# Patient Record
Sex: MALE | Race: WHITE | NOT HISPANIC OR LATINO | Employment: OTHER | URBAN - METROPOLITAN AREA
[De-identification: names, ages, dates, MRNs, and addresses within clinical notes are randomized per-mention and may not be internally consistent; named-entity substitution may affect disease eponyms.]

---

## 2017-06-13 ENCOUNTER — GENERIC CONVERSION - ENCOUNTER (OUTPATIENT)
Dept: OTHER | Facility: OTHER | Age: 72
End: 2017-06-13

## 2017-10-11 ENCOUNTER — ALLSCRIPTS OFFICE VISIT (OUTPATIENT)
Dept: OTHER | Facility: OTHER | Age: 72
End: 2017-10-11

## 2017-10-11 ENCOUNTER — TRANSCRIBE ORDERS (OUTPATIENT)
Dept: ADMINISTRATIVE | Facility: HOSPITAL | Age: 72
End: 2017-10-11

## 2017-10-11 DIAGNOSIS — G60.9 HEREDITARY PERIPHERAL NEUROPATHY: Primary | ICD-10-CM

## 2017-10-11 DIAGNOSIS — G60.9 HEREDITARY AND IDIOPATHIC NEUROPATHY: ICD-10-CM

## 2017-10-11 DIAGNOSIS — G60.9 IDIOPATHIC PERIPHERAL NEUROPATHY: Primary | ICD-10-CM

## 2017-10-11 DIAGNOSIS — G60.8 OTHER HEREDITARY AND IDIOPATHIC NEUROPATHIES: ICD-10-CM

## 2017-10-13 NOTE — CONSULTS
Plan  Idiopathic polyneuropathy    · (1) FOLATE; Status:Active; Requested for:11Oct2017;    Perform:[facility]; Due:11Oct2018; Ordered; For:Idiopathic polyneuropathy; Ordered By:Lauren Carroll;   · (1) PROTEIN ELECTRO, SERUM; Status:Active; Requested for:11Oct2017;    Perform:[facility]; Due:11Oct2018; Ordered; For:Idiopathic polyneuropathy; Ordered By:Lauren Carroll;   · (Q) METHYLMALONIC ACID AND HOMOCYSTEINE (NUTRITIONAL AND CONGENITAL); Status:Active; Requested for:11Oct2017;    Perform:[facility]; Due:11Oct2018; Ordered; For:Idiopathic polyneuropathy; Ordered By:Lauren Carroll;   · EMG TWO EXTREMITIES WITH OR W/O RELATED PARASPINAL AREAS; Status:Active; Requested for:11Oct2017;    Perform:[facility]; Due:11Oct2018; Last Updated By: Abiodun Suárez; 10/11/2017 12:07:30 PM;Ordered; For:Idiopathic polyneuropathy; Ordered By:Lauren Carroll;  TWO : RUE  ONE : HELDER   · Follow-up visit in 2 months Evaluation and Treatment  Follow-up  Status: Complete   Done: [id number]   Ordered; For: Idiopathic polyneuropathy; Ordered By: Alina Elder Performed:  Due: [id number]; Last Updated By: Abiodun Suárez; 10/11/2017 12:07:30 PM  Idiopathic polyneuropathy, Mixed sensory-motor polyneuropathy    · (1) VITAMIN B12; Status:Active; Requested for:11Oct2017;    Perform:[facility]; Due:11Oct2018; Ordered; For:Idiopathic polyneuropathy, Mixed sensory-motor polyneuropathy; Ordered By:Lauren Carroll;   · EMG TWO EXTREMITIES WITH OR W/O RELATED PARASPINAL AREAS; Status:Active; Requested for:11Oct2017;    Perform:[facility]; Due:11Oct2018; Last Updated By: Abiodun Suárez; 10/11/2017 12:07:30 PM;Ordered; For:Idiopathic polyneuropathy, Mixed sensory-motor polyneuropathy; Ordered By:Lauren Carroll;  TWO : RLE  ONE : LLE    Discussion/Summary  Discussion Summary:   65 y/o male with a history of diabetes presents with numbness and tingling in legs and arms , exam reveals evidence of a polyneuropathy in his legs b12, folate , mma and homcysteine, SPEP , b6 level, to determine any other causes EMG of UE , CTS and polyneuropathy   he is s/p prior release of carpal tunnel and cubital tunnel and EMG of the lower extremity for neuropathy in his ankles   it will help to determine extent of disease   Lyrica 75 mg daily , to continue , unable to tolerate tid dosing further recommendations after emerging database maintain good glucose control , can help to stem the progression of neuropathy but does not repair the damage   I discussed this in detail with Mr Khadra Mata and Earnesteen Night f/u after EMG testing  Chief Complaint  Chief Complaint Free Text Note Form: Patient presents for neurology consult for neuropathy      History of Present Illness  HPI: This is a 66 rh male with a history of diabetes and psoriatic arthritis presents for numbness and pain in hands and feet  The symptoms began 4 to 5 years ago in the hands at the same time he was diagnosed with diabetes  It involves the feet to the ankle bilaterally and all of the fingers on the right ( except for the thumb) and the second digit on the left hand   three years ago he underwent right carpal tunnel release and cubital tunnel release which reduced the numbness in the palm  No wrist , elbow or ankle pain   he was placed on Lyrica 75 mg tid but he developed side effects ( weight gain, increase in blood sugars and incontinence) and he reduced the dose to 75 mg daily   Lyrica 75 mg daily has decreased the intensity of the pain   recent hbAic was 6 2   gabapentin was tried but it caused side effects    no falls or problems with balance   he is accompanied by Earnesteen Night , his daughter   complaints include left hip pain and difficulty walking   he presents for further recommendations   psoriatic arthritis is better on Humira , less swelling in hands      Review of Systems  Neurological ROS:   Constitutional: no fever, no chills, no recent weight gain, no recent weight loss, no complaints of feeling tired, no changes in appetite  HEENT: hearing loss,-tinnitus-and-left hearing aid  Cardiovascular:  no chest pain or pressure, no palpitations present, the heart rate was not rapid or irregular, no swelling in the arms or legs, no poor circulation  Respiratory:  no unusual or persistant cough, no shortness of breath with or without exertion  Gastrointestinal:  no nausea, no vomiting, no diarrhea, no abdominal pain, no changes in bowel habits, no melena, no loss of bowel control  Genitourinary: feelings of urinary urgency-and-increased frequency  Musculoskeletal: arthralgias-and-pain while walking  Integumentary  no masses, no rash, no skin lesions, no livedo reticularis  Psychiatric:  no anxiety, no depression, no mood swings, no psychiatric hospitalizations, no sleep problems  Endocrine  no unusual weight loss or gain, no excessive urination, no excessive thirst, no hair loss or gain, no hot or cold intolerance, no menstrual period change or irregularity, no loss of sexual ability or drive, no erection difficulty, no nipple discharge  Hematologic/Lymphatic:  no unusual bleeding, no tendency for easy bruising, no clotting skin or lumps  Neurological General: snoring-and-waking up at night  Neurological Mental Status:  no confusion, no mood swings, no alteration or loss of consciousness, no difficulty expressing/understanding speech, no memory problems  Neurological Cranial Nerves:  no blurry or double vision, no loss of vision, no face drooping, no facial numbness or weakness, no taste or smell loss/changes, no hearing loss or ringing, no vertigo or dizziness, no dysphagia, no slurred speech  Neurological Motor findings include:  no tremor, no twitching, no cramping(pre/post exercise), no atrophy  Neurological Coordination: unsteadiness-  no unsteadiness, no vertigo or dizziness, no clumsiness, no problems reaching for objects     Neurological Sensory:  no numbness, no pain, no tingling, does not fall when eyes closed or taking a shower  Neurological Gait: difficulty walking  Active Problems  1  Idiopathic polyneuropathy (356 9) (G60 9)   2  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)    Past Medical History  1  History of arthritis (V13 4) (Z87 39)   2  History of diabetes mellitus (V12 29) (Z86 39)   3  History of hypertension (V12 59) (Z86 79)   4  History of myocardial infarction (412) (I25 2)    Surgical History  1  History of Hernia Repair   2  History of Neuroplasty Decompression Median Nerve At Carpal Tunnel   3  History of Neuroplasty With Transposition Of Ulnar Nerve - At Elbow   4  History of Surgery Prostate Prostatotomy    Family History  Mother    1  Family history of    2  Family history of cardiac disorder (V17 49) (Z82 49)   3  Family history of diabetes mellitus (V18 0) (Z83 3)  Father    4  Family history of    5  Family history of malignant neoplasm (V16 9) (Z80 9)  Sister    6  Family history of    7  Family history of malignant neoplasm (V16 9) (Z80 9)    Social History   · Coffee   · Former smoker (U42 53) (B06 238)   · No illicit drug use   · Retired   · Social drinker (V49 89) (Z78 9)   ·  (V61 07) (Z63 4)    Current Meds   1  AmLODIPine Besylate 10 MG Oral Tablet; TAKE 1 TABLET DAILY; Therapy: (Recorded:2017) to Recorded   2  Aspirin 81 MG CAPS; take 1 capsule daily; Therapy: (Recorded:2017) to Recorded   3  Folic Acid 1 MG Oral Tablet; Take 1 tablet daily; Therapy: (Recorded:2017) to Recorded   4  HydroCHLOROthiazide 25 MG Oral Tablet; TAKE 1 TABLET DAILY; Therapy: (Recorded:2017) to Recorded   5  Lyrica 75 MG Oral Capsule; TAKE 1 CAPSULE 3 TIMES DAILY; Therapy: (Recorded:2017) to Recorded   6  MetFORMIN HCl - 1000 MG Oral Tablet; take 2 tablet daily; Therapy: (Recorded:2017) to Recorded   7  Nabumetone 750 MG Oral Tablet; Take 1 tablet twice daily;    Therapy: (Recorded:2017) to Recorded   8  Probiotic CAPS; take 1 capsule daily; Therapy: (Recorded:11Oct2017) to Recorded   9  Simvastatin 40 MG Oral Tablet; TAKE 1 TABLET DAILY; Therapy: (Recorded:11Oct2017) to Recorded   10  TraMADol HCl - 50 MG Oral Tablet; TAKE 1 TABLET Every 6 hours; Therapy: (Recorded:11Oct2017) to Recorded   11  Vitamin B12 TABS; TAKE 1 TABLET DAILY AS DIRECTED; Therapy: (Recorded:11Oct2017) to Recorded   12  Vitamin B6 TABS; TAKE 1 TABLET DAILY AS DIRECTED; Therapy: (Recorded:11Oct2017) to Recorded   13  Vitamin C 1000 MG Oral Tablet; TAKE 1 TABLET DAILY; Therapy: (Recorded:11Oct2017) to Recorded   14  Vitamin D3 2000 UNIT Oral Tablet; Take 1 tablet daily; Therapy: (Recorded:11Oct2017) to Recorded    Allergies  1  Penicillins    Vitals  Signs   Recorded: [id number] 10:59AM   Heart Rate: 88  Respiration: 16  Systolic: 341  Diastolic: 72  Height: 5 ft 7 in  Weight: 242 lb   BMI Calculated: 37 9  BSA Calculated: 2 19  O2 Saturation: 97    Physical Exam    Constitutional   General Appearance: Appears appropriate for age, healthy, well developed, appropriately groomed and appropriately dressed    Head and Face   Head and face: Atraumatic on inspection  Facial strength normal    Eyes   Ophthalmoscopic examination: Vision is grossly normal  Gross visual field testing by confrontation shows no abnormalities  EOMI in both eyes  Conjunctivae clear  Eyelids normal palpebral fissures equal  Orbits exhibit normal position  No discharge from the eyes  PERRL  Neck   Neck and thyroid: Normal to inspection and palpation  Pulmonary   Respiratory effort: Lungs are clear bilaterally  Auscultation of lungs: Clear to auscultation  Cardiovascular   Auscultation of heart: Rate is regular  Rhythm is regular  Carotid pulses: Normal, 2+ bilaterally  -no bruits  Musculoskeletal   Gait and Station: Walks with normal gait  Tandem walk test is normal  Romberg's test is negative     Muscle strength: Normal strength throughout  Muscle tone: No atrophy, abnormal movements, flaccidity, cogwheeling or spasticity  Involuntary movements: None observed  Stability: Normal       Neurologic   Orientation to person, place, and time: Normal     Recent and remote memory: Demonstrates normal memory  Attention span and concentration: Normal thought process and attention span  Language: Names objects, able to repeat phrases and speaks spontaneously  Fund of knowledge: Normal vocabulary with appropriate knowledge of current events and past history  Sensation: Abnormal  -(jps to ankle , vibration absent in toes , temp to midankle , light touch to mid ankle )   Reflexes: Abnormal   Deep tendon reflexes: 1+ right biceps,-1+ left biceps,-1+ right triceps,-1+ left triceps,-1+ right patella,-1+ left patella,-0 right ankle jerk-and-0 left ankle jerk  Coordination: Cerebellum function intact  No involuntary movement or psychomotor activity  Cortical function: Normal     Cranial Nerve Exam   I: Normal with no deficit    II: Normal with no deficit  III,IV, VI: Normal with no deficit  V: Normal with no deficit  VII: Normal with no deficit  VIII: Normal with no deficit  IX: Normal with no deficit  X: Normal with no deficit  XI: Normal with no deficit  XII: Normal with no deficit  Signatures   Electronically signed by :  Rosenda Ledbetter DO; Oct 12 2017 10:18AM EST                       (Author)

## 2017-10-16 ENCOUNTER — HOSPITAL ENCOUNTER (OUTPATIENT)
Dept: NEUROLOGY | Facility: AMBULATORY SURGERY CENTER | Age: 72
Discharge: HOME | End: 2017-10-16
Payer: MEDICARE

## 2017-10-16 DIAGNOSIS — G60.9 IDIOPATHIC PERIPHERAL NEUROPATHY: ICD-10-CM

## 2017-10-16 PROCEDURE — 95886 MUSC TEST DONE W/N TEST COMP: CPT

## 2017-10-16 PROCEDURE — 95910 NRV CNDJ TEST 7-8 STUDIES: CPT

## 2017-11-14 ENCOUNTER — HOSPITAL ENCOUNTER (OUTPATIENT)
Dept: NEUROLOGY | Facility: AMBULATORY SURGERY CENTER | Age: 72
Discharge: HOME | End: 2017-11-14
Payer: MEDICARE

## 2017-11-14 DIAGNOSIS — G60.9 HEREDITARY PERIPHERAL NEUROPATHY: ICD-10-CM

## 2017-11-14 PROCEDURE — 95911 NRV CNDJ TEST 9-10 STUDIES: CPT

## 2017-11-14 PROCEDURE — 95886 MUSC TEST DONE W/N TEST COMP: CPT

## 2018-01-12 VITALS
OXYGEN SATURATION: 97 % | WEIGHT: 242 LBS | SYSTOLIC BLOOD PRESSURE: 144 MMHG | DIASTOLIC BLOOD PRESSURE: 72 MMHG | HEART RATE: 88 BPM | RESPIRATION RATE: 16 BRPM | HEIGHT: 67 IN | BODY MASS INDEX: 37.98 KG/M2

## 2018-02-05 ENCOUNTER — OFFICE VISIT (OUTPATIENT)
Dept: NEUROLOGY | Facility: CLINIC | Age: 73
End: 2018-02-05
Payer: MEDICARE

## 2018-02-05 VITALS
DIASTOLIC BLOOD PRESSURE: 76 MMHG | WEIGHT: 248 LBS | BODY MASS INDEX: 37.59 KG/M2 | HEART RATE: 76 BPM | SYSTOLIC BLOOD PRESSURE: 132 MMHG | HEIGHT: 68 IN

## 2018-02-05 DIAGNOSIS — E08.42 DIABETIC POLYNEUROPATHY ASSOCIATED WITH DIABETES MELLITUS DUE TO UNDERLYING CONDITION (HCC): Primary | ICD-10-CM

## 2018-02-05 DIAGNOSIS — G60.8 MIXED SENSORY-MOTOR POLYNEUROPATHY: ICD-10-CM

## 2018-02-05 PROCEDURE — 99213 OFFICE O/P EST LOW 20 MIN: CPT | Performed by: PSYCHIATRY & NEUROLOGY

## 2018-02-05 RX ORDER — LIDOCAINE 50 MG/G
3 PATCH TOPICAL DAILY
Qty: 90 PATCH | Refills: 5 | Status: SHIPPED | OUTPATIENT
Start: 2018-02-05 | End: 2018-06-11 | Stop reason: ALTCHOICE

## 2018-02-05 RX ORDER — AMLODIPINE BESYLATE AND BENAZEPRIL HYDROCHLORIDE 5; 10 MG/1; MG/1
1 CAPSULE ORAL DAILY
COMMUNITY
Start: 2017-11-16

## 2018-02-05 RX ORDER — PREGABALIN 75 MG/1
1 CAPSULE ORAL 3 TIMES DAILY
COMMUNITY
End: 2018-02-05 | Stop reason: SDUPTHER

## 2018-02-05 RX ORDER — PREGABALIN 50 MG/1
1 CAPSULE ORAL DAILY
COMMUNITY
Start: 2017-11-29 | End: 2018-02-05 | Stop reason: SDUPTHER

## 2018-02-05 RX ORDER — HYDROCHLOROTHIAZIDE 25 MG/1
25 TABLET ORAL DAILY
COMMUNITY
Start: 2017-11-16

## 2018-02-05 RX ORDER — SIMVASTATIN 40 MG
40 TABLET ORAL DAILY
COMMUNITY

## 2018-02-05 RX ORDER — FOLIC ACID 1 MG/1
1 TABLET ORAL DAILY
COMMUNITY

## 2018-02-05 RX ORDER — NABUMETONE 750 MG/1
750 TABLET, FILM COATED ORAL 2 TIMES DAILY
COMMUNITY
End: 2019-05-13

## 2018-02-05 RX ORDER — PREGABALIN 75 MG/1
75 CAPSULE ORAL 2 TIMES DAILY
Qty: 60 CAPSULE | Refills: 5 | Status: SHIPPED | OUTPATIENT
Start: 2018-02-05 | End: 2018-06-11 | Stop reason: SDUPTHER

## 2018-02-05 RX ORDER — FUROSEMIDE 40 MG/1
40 TABLET ORAL AS NEEDED
COMMUNITY
Start: 2017-07-24 | End: 2020-02-10

## 2018-02-05 NOTE — ASSESSMENT & PLAN NOTE
Increase Lyrica to 75 mg bid , watch for increase in weight gain     Add lidoderm patches , up to three patches daily or lidoderm otc cream , it is helping the pt     On less b12 , level high in past     F/u in 4mths

## 2018-02-05 NOTE — PROGRESS NOTES
Patient ID: Gely Alaniz is a 74 y o  male  Assessment/Plan:    Mixed sensory-motor polyneuropathy  Increase Lyrica to 75 mg bid , watch for increase in weight gain     Add lidoderm patches , up to three patches daily or lidoderm otc cream , it is helping the pt     On less b12 , level high in past     F/u in 4mths                    Subjective:    HPI       This is a 66 rh male with a history of diabetes and psoriatic arthritis presents for numbness and pain in hands and feet  The symptoms began 4 to 5 years ago in the hands at the same time he was diagnosed with diabetes  It involves the feet to the ankle bilaterally and all of the fingers on the right ( except for the thumb) and the second digit on the left hand   three years ago he underwent right carpal tunnel release and cubital tunnel release which reduced the numbness in the palm  No wrist , elbow or ankle pain   he was placed on Lyrica 75 mg tid but he developed side effects ( weight gain, increase in blood sugars and incontinence) and he reduced the dose to 75 mg daily   gabapentin was tried but it caused side effects   no falls or problems with balance   He underwent emg of the le and ue  They confirmed the presence of a polyneruapthy   Lyrica was increased to 75 mg at ngith and 50 mg at bedtime   Overall the sympotms are better   He is using otc lidoderm cream evry 3 to 4 days with benefit   he is accompanied by Rosalind Ugalde , his daughter   complaints include left hip pain and difficulty walking   he presents for further recommendations   psoriatic arthritis is better on Humira , less swelling in hands           October 2017 b12   1450 , b12 500mg , spep  Normal   hemocysteins  0 29 , last hb a1c 6 6     f  The following portions of the patient's history were reviewed and updated as appropriate: PMX, pshx, allergies , shx and family history          Objective:    Blood pressure 132/76, pulse 76, height 5' 8" (1 727 m), weight 112 kg (248 lb)      Physical Exam   Constitutional: He appears well-developed  HENT:   Head: Normocephalic  Eyes: EOM are normal  Pupils are equal, round, and reactive to light  Neck: Normal range of motion  Neurological: He has normal strength  Neurological Exam    Mental Status  The patient is alert  He has no visuospatial neglect  He has normal attention span and concentration  He has a normal fund of knowledge  Cranial Nerves    CN II: The patient's visual acuity and visual fields are normal   CN III, IV, VI: The patient's pupils are equally round and reactive to light and ocular movements are normal   CN V: The patient has normal facial sensation  CN VII:  The patient has symmetric facial movement  CN VIII:  The patient's hearing is normal   CN IX, X: The patient has symmetric palate movement and normal gag reflex  CN XI: The patient's shoulder shrug strength is normal   CN XII: The patient's tongue is midline without atrophy or fasciculations  Motor   His strength is 5/5 throughout all four extremities  Limited by hip pain      Sensory    Vibration  3sec in left  , absent on right  , jps intact , absent in toes , 10 sec in ue      Reflexes  1 in ue , trace in le, toes down going          ROS:    Review of Systems   Constitutional: Negative  HENT: Negative  Eyes: Negative  Respiratory: Negative  Cardiovascular: Positive for leg swelling  Gastrointestinal: Negative  Endocrine: Negative  Genitourinary: Positive for frequency  Musculoskeletal: Negative  Skin: Negative  Allergic/Immunologic: Negative  Neurological: Negative  Hematological: Negative  Psychiatric/Behavioral: Negative

## 2018-02-07 ENCOUNTER — DOCUMENTATION (OUTPATIENT)
Dept: NEUROLOGY | Facility: CLINIC | Age: 73
End: 2018-02-07

## 2018-06-11 ENCOUNTER — OFFICE VISIT (OUTPATIENT)
Dept: NEUROLOGY | Facility: CLINIC | Age: 73
End: 2018-06-11
Payer: MEDICARE

## 2018-06-11 VITALS
HEIGHT: 68 IN | DIASTOLIC BLOOD PRESSURE: 84 MMHG | SYSTOLIC BLOOD PRESSURE: 132 MMHG | BODY MASS INDEX: 36.54 KG/M2 | HEART RATE: 94 BPM | WEIGHT: 241.1 LBS | RESPIRATION RATE: 12 BRPM

## 2018-06-11 DIAGNOSIS — G60.8 MIXED SENSORY-MOTOR POLYNEUROPATHY: Primary | ICD-10-CM

## 2018-06-11 DIAGNOSIS — E08.42 DIABETIC POLYNEUROPATHY ASSOCIATED WITH DIABETES MELLITUS DUE TO UNDERLYING CONDITION (HCC): ICD-10-CM

## 2018-06-11 PROCEDURE — 99213 OFFICE O/P EST LOW 20 MIN: CPT | Performed by: PSYCHIATRY & NEUROLOGY

## 2018-06-11 RX ORDER — LANOLIN ALCOHOL/MO/W.PET/CERES
500 CREAM (GRAM) TOPICAL
COMMUNITY

## 2018-06-11 RX ORDER — TRAMADOL HYDROCHLORIDE 50 MG/1
60 TABLET ORAL EVERY 6 HOURS PRN
COMMUNITY

## 2018-06-11 RX ORDER — PREGABALIN 75 MG/1
75 CAPSULE ORAL 3 TIMES DAILY
Qty: 90 CAPSULE | Refills: 5 | Status: SHIPPED | OUTPATIENT
Start: 2018-06-11 | End: 2018-11-12 | Stop reason: SINTOL

## 2018-06-11 RX ORDER — MULTIVIT WITH MINERALS/LUTEIN
1000 TABLET ORAL DAILY
COMMUNITY

## 2018-06-11 RX ORDER — MULTIVITAMIN WITH IRON
100 TABLET ORAL DAILY
COMMUNITY

## 2018-06-11 NOTE — ASSESSMENT & PLAN NOTE
Will increase Lyrica to 75 mg tid,, he is willing to try despite prior side effects    Asked pt to use Aspercreme to more frequently     May be beneficial for pruritus      f/u in 6 mths

## 2018-07-16 NOTE — TELEPHONE ENCOUNTER
Pt called back and put his daughter on the phone, she states this was an error, they just picked up the 75 mg TID script

## 2018-07-16 NOTE — TELEPHONE ENCOUNTER
As per last visit he was on lyrica 75 mg tid   Nick Christine Why does he need 50 mg tablets ? Is he getting worse ?

## 2018-11-12 ENCOUNTER — OFFICE VISIT (OUTPATIENT)
Dept: NEUROLOGY | Facility: CLINIC | Age: 73
End: 2018-11-12
Payer: MEDICARE

## 2018-11-12 VITALS
DIASTOLIC BLOOD PRESSURE: 60 MMHG | WEIGHT: 247 LBS | HEIGHT: 68 IN | SYSTOLIC BLOOD PRESSURE: 132 MMHG | RESPIRATION RATE: 16 BRPM | HEART RATE: 88 BPM | BODY MASS INDEX: 37.44 KG/M2

## 2018-11-12 DIAGNOSIS — G60.8 MIXED SENSORY-MOTOR POLYNEUROPATHY: Primary | ICD-10-CM

## 2018-11-12 PROCEDURE — 99213 OFFICE O/P EST LOW 20 MIN: CPT | Performed by: PSYCHIATRY & NEUROLOGY

## 2018-11-12 RX ORDER — ISOSORBIDE MONONITRATE 30 MG/1
30 TABLET, EXTENDED RELEASE ORAL
COMMUNITY
Start: 2018-09-12 | End: 2022-04-08

## 2018-11-12 RX ORDER — HYDRALAZINE HYDROCHLORIDE 50 MG/1
50 TABLET, FILM COATED ORAL
COMMUNITY
Start: 2018-09-26 | End: 2019-05-13

## 2018-11-12 RX ORDER — POTASSIUM CHLORIDE 20 MEQ/1
20 TABLET, EXTENDED RELEASE ORAL
COMMUNITY
Start: 2018-10-03 | End: 2022-04-08

## 2018-11-12 RX ORDER — PREGABALIN 50 MG/1
50 CAPSULE ORAL 2 TIMES DAILY
Qty: 180 CAPSULE | Refills: 1 | Status: SHIPPED | OUTPATIENT
Start: 2018-11-12 | End: 2019-05-13 | Stop reason: SDUPTHER

## 2018-11-12 RX ORDER — METOPROLOL SUCCINATE 50 MG/1
50 TABLET, EXTENDED RELEASE ORAL
COMMUNITY
Start: 2018-10-10 | End: 2022-04-08

## 2018-11-12 NOTE — ASSESSMENT & PLAN NOTE
His neuropathy symptoms are somewhat stable  His dose was decreased to 75 /  50 several months ago  due to the fact that he has no burning dysesthesias but continues to have some lower extremity swelling I have  decreased it further to 50 mg twice a day  This is the most likely cause of neuropathy is his known history of diabetes  Overall his exam is stable  Follow he is to follow up in approximately six months

## 2019-05-13 ENCOUNTER — OFFICE VISIT (OUTPATIENT)
Dept: NEUROLOGY | Facility: CLINIC | Age: 74
End: 2019-05-13
Payer: MEDICARE

## 2019-05-13 VITALS
WEIGHT: 250.6 LBS | SYSTOLIC BLOOD PRESSURE: 128 MMHG | BODY MASS INDEX: 37.98 KG/M2 | HEIGHT: 68 IN | DIASTOLIC BLOOD PRESSURE: 74 MMHG | RESPIRATION RATE: 12 BRPM

## 2019-05-13 DIAGNOSIS — G60.8 MIXED SENSORY-MOTOR POLYNEUROPATHY: ICD-10-CM

## 2019-05-13 DIAGNOSIS — G60.9 HEREDITARY AND IDIOPATHIC NEUROPATHY: ICD-10-CM

## 2019-05-13 DIAGNOSIS — G56.21 CUBITAL TUNNEL SYNDROME ON RIGHT: Primary | ICD-10-CM

## 2019-05-13 DIAGNOSIS — H49.22 SIXTH NERVE PALSY OF LEFT EYE: ICD-10-CM

## 2019-05-13 PROCEDURE — 99214 OFFICE O/P EST MOD 30 MIN: CPT | Performed by: PSYCHIATRY & NEUROLOGY

## 2019-05-13 RX ORDER — NITROGLYCERIN 0.4 MG/1
0.4 TABLET SUBLINGUAL
COMMUNITY
Start: 2018-12-27 | End: 2022-04-08

## 2019-05-13 RX ORDER — TORSEMIDE 20 MG/1
40 TABLET ORAL 3 TIMES DAILY
COMMUNITY
Start: 2019-04-12

## 2019-05-13 RX ORDER — DOXAZOSIN 2 MG/1
2 TABLET ORAL
COMMUNITY
Start: 2018-12-27 | End: 2022-04-08

## 2019-05-13 RX ORDER — PREGABALIN 50 MG/1
50 CAPSULE ORAL 3 TIMES DAILY
Qty: 270 CAPSULE | Refills: 1 | Status: SHIPPED | OUTPATIENT
Start: 2019-05-13 | End: 2019-11-18 | Stop reason: SDUPTHER

## 2019-05-13 RX ORDER — SIMVASTATIN 40 MG
TABLET ORAL
COMMUNITY
Start: 2018-10-01 | End: 2019-05-13

## 2019-06-13 ENCOUNTER — TELEPHONE (OUTPATIENT)
Dept: OTHER | Facility: HOSPITAL | Age: 74
End: 2019-06-13

## 2019-06-13 ENCOUNTER — HOSPITAL ENCOUNTER (OUTPATIENT)
Dept: NEUROLOGY | Facility: AMBULATORY SURGERY CENTER | Age: 74
Discharge: HOME | End: 2019-06-13
Payer: MEDICARE

## 2019-06-13 DIAGNOSIS — G56.21 CUBITAL TUNNEL SYNDROME ON RIGHT: Primary | ICD-10-CM

## 2019-06-13 DIAGNOSIS — G56.21 CUBITAL TUNNEL SYNDROME ON RIGHT: ICD-10-CM

## 2019-06-13 DIAGNOSIS — G60.8 MIXED SENSORY-MOTOR POLYNEUROPATHY: ICD-10-CM

## 2019-06-13 PROCEDURE — 95886 MUSC TEST DONE W/N TEST COMP: CPT | Performed by: PSYCHIATRY & NEUROLOGY

## 2019-06-13 PROCEDURE — 95912 NRV CNDJ TEST 11-12 STUDIES: CPT | Performed by: PSYCHIATRY & NEUROLOGY

## 2019-11-14 ENCOUNTER — TELEPHONE (OUTPATIENT)
Dept: NEUROLOGY | Facility: CLINIC | Age: 74
End: 2019-11-14

## 2019-11-18 ENCOUNTER — OFFICE VISIT (OUTPATIENT)
Dept: NEUROLOGY | Facility: CLINIC | Age: 74
End: 2019-11-18
Payer: MEDICARE

## 2019-11-18 ENCOUNTER — TELEPHONE (OUTPATIENT)
Dept: NEUROLOGY | Facility: CLINIC | Age: 74
End: 2019-11-18

## 2019-11-18 VITALS
DIASTOLIC BLOOD PRESSURE: 68 MMHG | BODY MASS INDEX: 38.68 KG/M2 | WEIGHT: 254.4 LBS | HEART RATE: 69 BPM | SYSTOLIC BLOOD PRESSURE: 120 MMHG

## 2019-11-18 DIAGNOSIS — R51.9 PERSISTENT HEADACHES: ICD-10-CM

## 2019-11-18 DIAGNOSIS — G60.8 MIXED SENSORY-MOTOR POLYNEUROPATHY: ICD-10-CM

## 2019-11-18 DIAGNOSIS — G56.21 CUBITAL TUNNEL SYNDROME ON RIGHT: Primary | ICD-10-CM

## 2019-11-18 DIAGNOSIS — H49.22 SIXTH NERVE PALSY OF LEFT EYE: ICD-10-CM

## 2019-11-18 DIAGNOSIS — G60.9 HEREDITARY AND IDIOPATHIC NEUROPATHY, UNSPECIFIED: ICD-10-CM

## 2019-11-18 PROCEDURE — 99214 OFFICE O/P EST MOD 30 MIN: CPT | Performed by: PSYCHIATRY & NEUROLOGY

## 2019-11-18 RX ORDER — DULOXETIN HYDROCHLORIDE 30 MG/1
30 CAPSULE, DELAYED RELEASE ORAL DAILY
Qty: 30 CAPSULE | Refills: 2 | Status: SHIPPED | OUTPATIENT
Start: 2019-11-18 | End: 2020-06-15 | Stop reason: SINTOL

## 2019-11-18 RX ORDER — PREGABALIN 50 MG/1
50 CAPSULE ORAL 2 TIMES DAILY
Qty: 180 CAPSULE | Refills: 1 | Status: SHIPPED | OUTPATIENT
Start: 2019-11-18 | End: 2020-07-23 | Stop reason: SDUPTHER

## 2019-11-18 NOTE — PROGRESS NOTES
Patient ID: Lynda Desouza is a 74 y o  male  Assessment/Plan:    Mixed sensory-motor polyneuropathy  Mr Alirio Paul  is a 64-year-old patient with history of diabetes and polyneuropathy  He is currently on Lyrica 50 mg twice a day higher doses have resulted in lower extremity edema  His examination is relatively unchanged but he does report persistent dysesthesias  I have added duloxetine 30 mg a day  His recent CMP was analyzed and that liver function tests were normal     I did order a B12 and folate    Cubital tunnel syndrome on right  He does have cubital tunnel syndrome that is non localizable  He continues to have numbness and tingling in the upper extremities  Placed another consult Orthopedics   I have reordered it since he continues to have more severe symptoms in the 4th and 5th digit on the right hand  Sixth nerve palsy of left eye  This is now resolved  Persistent headaches  He describes persistent unilateral headaches occurring on the right side This is been occurring for months    He uses tylenol up ot  to six a day some improvement however the headaches usually return  This seems to be consistent with  rebound headaches and chronic daily headaches  He did have an MRI of the brain which showed no evidence of new ischemia  We did consider the use of nortriptyline and or Elavil however he has a known history of cardiac dysfunction  Beta-blockers are relatively contraindicated  I did provide him with a left list of dietary restrictions for headaches as well as a list of headache triggers we have started him on Cymbalta 30 mg a day which may help both neuropathic pain and headaches  If the headaches persist we can also try Topamax or Depakote    He is to call for progress report in six weeks otherwise return to our offices in three months            Diagnoses and all orders for this visit:    Cubital tunnel syndrome on right  -     Ambulatory referral to Hand Surgery;  Future    Mixed sensory-motor polyneuropathy  -     DULoxetine (CYMBALTA) 30 mg delayed release capsule; Take 1 capsule (30 mg total) by mouth daily  -     pregabalin (LYRICA) 50 mg capsule; Take 1 capsule (50 mg total) by mouth 2 (two) times a day 3  Month supply  -     Vitamin B12; Future  -     Folate; Future    Hereditary and idiopathic neuropathy, unspecified   -     Vitamin B12; Future  -     Folate; Future    Sixth nerve palsy of left eye    Persistent headaches    Other orders  -     insulin aspart (NovoLOG) 100 Units/mL injection pen; Inject 16 units before lunch and 28 units before dinner         Subjective: This is a 74  rh male with a history of diabetes and psoriatic arthritis presents in a follow up visit for numbness and pain in hands and feet  He is currently on Lyrica 50 mg twice a day  Overall the swelling has improved  However he does complain of dysesthesias in the pads of his feet and intermittently in the right hand  The right hand numbness and tingling involving involves the last two 2 to 3 digits  He has difficulty with strength in the right hand and has coordination problems  He did undergo an EMG study  It was abnormal and did  demonstrate continued evidence of a polyneuropathy however there is absent on her right ulnar sensory nerve as well as abnormal dorsal ulnar cutaneous nerve  It was compared to the prior study in 2017 and there is some progression  It does reveal evidence of a mild ulnar non localizable neuropathy on the right below the elbow and a mild to moderate median nerve entrapment neuropathy at the right wrist    We did discussed orthopedic evaluation at that time but it was not pursued  In the interim he reports continued issues with right arm socially the 4th and 5th digit        He was  Admitted  [facility] three weeks ago for an acute a left 6th nerve palsy  He did have an imaging studies performed  An MRA did demonstrate a basilar proximal narrowing    MRI brain without contrast and MRI of the orbits were negative for mass effect, recent infarct, intracranial hemorrhage, or hydrocephalus  White matter changes were noted likely secondary to moderate chronic microvascular ischemic disease  Focus of T1 signal abnormality was noted in the spinous process of C2 and complex right parotid lesion measuring up to 2 2 cm were noted  With felt to be more of a cyst related  He was diagnosed with cranial VI palsy and admitted for observation  MRA head 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  Diminished flow-related enhancement of the dominant right V4 segment of the vertebral artery relative to the nondominant hand was also noted  Neurology was consulted and suspected left 6th nerve palsy likely secondary to diabetic small vessel disease  In the interim he was also seen by his ophthalmologist t who felt that this may take several weeks to resolve  After six or seven months the double vision improved  He now continues on aspirin daily  Paul Nguyen three years ago he underwent right carpal tunnel release and cubital tunnel release which reduced the numbness in the palm  gabapentin was tried but it caused side effects   Other complaints include persistent headaches occur  They do occur on the right side and are unilateral   He denies any nausea vomiting but does report some slight photophobia  This is been ongoing since January  He does utilize Tylenol up to six a day with some benefit  He denies any lateralizing numbness or tingling with the headaches        Does have a history of sleep apnea but is unable to tolerate the CPAP machine                The following portions of the patient's history were reviewed and updated as appropriate:   He  has a past medical history of CAD (coronary artery disease), Chronic kidney disease, History of prostate cancer, HTN (hypertension), Hyperlipidemia, Leukocytosis, Psoriatic arthritis ([facility] [address] ), SOB (shortness of breath), Syncope, Type 2 diabetes mellitus without complication, without long-term current use of insulin ([address] ), and Weakness generalized  He  has a past surgical history that includes Carpal tunnel release (Bilateral, 2015); Elbow surgery (Right, 2015); Hernia repair; Shoulder arthroscopy; and Skin cancer excision (11/2017)  His family history includes Cancer in his mother; Heart disease in his father  He  reports that he has never smoked  He has never used smokeless tobacco  He reports that he does not drink alcohol or use drugs    Current Outpatient Medications   Medication Sig Dispense Refill    adalimumab (HUMIRA) 40 mg/0 8 mL PSKT Inject 40 mg under the skin every 14 (fourteen) days      amLODIPine-benazepril (LOTREL 5-10) 5-10 MG per capsule Take 1 capsule by mouth daily      Ascorbic Acid (VITAMIN C) 1000 MG tablet Take 1,000 mg by mouth daily      ASPIRIN 81 PO Take 81 mg by mouth daily      cyanocobalamin (VITAMIN B-12) 1,000 mcg tablet Take 500 mcg by mouth      doxazosin (CARDURA) 2 mg tablet Take 2 mg by mouth      folic acid (FOLVITE) 1 mg tablet Take 1 mg by mouth daily      furosemide (LASIX) 40 mg tablet Take 40 mg by mouth as needed       hydrochlorothiazide (HYDRODIURIL) 25 mg tablet Take 25 mg by mouth daily      insulin aspart (NovoLOG) 100 Units/mL injection pen Inject 16 units before lunch and 28 units before dinner      insulin glargine (BASAGLAR KWIKPEN) 100 units/mL injection pen 120 Units daily      isosorbide mononitrate (IMDUR) 30 mg 24 hr tablet Take 30 mg by mouth      metoprolol succinate (TOPROL-XL) 50 mg 24 hr tablet Take 50 mg by mouth      nitroglycerin (NITROSTAT) 0 4 mg SL tablet Place 0 4 mg under the tongue      potassium chloride (K-DUR,KLOR-CON) 20 mEq tablet Take 20 mEq by mouth      pregabalin (LYRICA) 50 mg capsule Take 1 capsule (50 mg total) by mouth 2 (two) times a day 3  Month supply 180 capsule 1    pyridoxine (VITAMIN B6) 100 mg tablet Take 100 mg by mouth daily      simvastatin (ZOCOR) 40 mg tablet Take 40 mg by mouth daily      torsemide (DEMADEX) 20 mg tablet Take 40 mg by mouth 2 (two) times a day      traMADol (ULTRAM) 50 mg tablet Take 60 mg by mouth every 6 (six) hours as needed      DULoxetine (CYMBALTA) 30 mg delayed release capsule Take 1 capsule (30 mg total) by mouth daily 30 capsule 2    Probiotic Product (PROBIOTIC-10 ULTIMATE PO) Take by mouth       No current facility-administered medications for this visit  He is allergic to clindamycin; doxycycline; penicillins; fish oil; methotrexate; methotrexate derivatives; ranitidine; and sulfamethoxazole-trimethoprim            Objective:    Blood pressure 120/68, pulse 69, weight 115 kg (254 lb 6 4 oz)  Physical Exam   Constitutional: He appears well-developed  HENT:   Head: Normocephalic  Eyes: Pupils are equal, round, and reactive to light  Lids are normal    Neurological:   Reflex Scores:       Tricep reflexes are Tr on the right side and Tr on the left side  Bicep reflexes are 1+ on the right side and 1+ on the left side  Patellar reflexes are 1+ on the right side and 1+ on the left side  Achilles reflexes are Tr on the right side and Tr on the left side  Psychiatric: His speech is normal    Vitals reviewed  Neurological Exam  Mental Status   Oriented to person, place, time and situation  Speech is normal  Language is fluent with no aphasia  Cranial Nerves  CN II: Visual acuity is normal  Visual fields full to confrontation  CN III, IV, VI: Extraocular movements intact bilaterally  Normal lids and orbits bilaterally  Pupils equal round and reactive to light bilaterally  CN V: Facial sensation is normal   CN VII: Full and symmetric facial movement  CN VIII: Hearing is normal   CN IX, X: Palate elevates symmetrically  Normal gag reflex  CN XI: Shoulder shrug strength is normal   CN XII: Tongue midline without atrophy or fasciculations    No papilledema was noted  Motor  Normal muscle bulk throughout  No fasciculations present  She is five months out of five strength proximally in the legs but otherwise strength was full  Sensory  10 sec in hands, vibration to knees , temp to  Calf ,        , absent vibration  in toes, ankles , jps intact in  feet,       Reflexes                                           Right                      Left  Biceps                                 1+                         1+  Triceps                                Tr                         Tr  Patellar                                1+                         1+  Achilles                                Tr                         Tr  Plantar                           Downgoing                Downgoing    Coordination  Right: Finger-to-nose normal   Left: Finger-to-nose normal     Gait Unable to rise from chair without using arms  He has a wide-based  He was unable to tandem gait       Review of systems obtained by the medical assistant as below was reviewed with the patient at today's visit    ROS:    Review of Systems   Constitutional: Negative  Negative for appetite change and fever  HENT: Negative  Negative for hearing loss, tinnitus, trouble swallowing and voice change  Eyes: Negative  Negative for photophobia and pain  Respiratory: Negative  Negative for shortness of breath  Cardiovascular: Negative  Negative for palpitations  Gastrointestinal: Negative  Negative for nausea and vomiting  Endocrine: Negative  Negative for cold intolerance and heat intolerance  Genitourinary: Negative  Negative for dysuria, frequency and urgency  Musculoskeletal: Negative  Negative for myalgias and neck pain  Skin: Negative  Negative for rash  Allergic/Immunologic: Negative  Neurological: Positive for numbness (left hand more stinging and numbness, )   Negative for dizziness, tremors, seizures, syncope, facial asymmetry, speech difficulty, weakness, light-headedness and headaches  Hematological: Negative  Does not bruise/bleed easily  Psychiatric/Behavioral: Negative  Negative for confusion, hallucinations and sleep disturbance  All other systems reviewed and are negative

## 2019-11-18 NOTE — TELEPHONE ENCOUNTER
Crow Abdi called from  [facility] at 9:19am and said that the patient was there under the assumption that they were coming to that location for an appointment at 9:30am today but she was sending them over because they have an office visit  I called and let the nurse's pod know that they still should be here on time since they were a couple minutes away  Crow Trinidad said that they may be upset since the patient showed at the wrong location  At 9:45am, Francia Roberson called us in since the patient went to the [facility]  I called the patient on [phone] after talking to Francia Roberson and Nathanael Monroe to let them know that we would be able to get them in with Dr Kat Velez at 12:00pm over the doctor's lunch   I called back Crow Abdi at 9:54am to clarify the situation and she told me that a  from [facility] was trying to call the patient to let them know that their appointment was at the [address] [place] [address] location for 9:30am

## 2019-11-18 NOTE — ASSESSMENT & PLAN NOTE
Mr Rita Adrian  is a 68-year-old patient with history of diabetes and polyneuropathy  He is currently on Lyrica 50 mg twice a day higher doses have resulted in lower extremity edema  His examination is relatively unchanged but he does report persistent dysesthesias  I have added duloxetine 30 mg a day    His recent CMP was analyzed and that liver function tests were normal     I did order a B12 and folate

## 2019-11-18 NOTE — ASSESSMENT & PLAN NOTE
He describes persistent unilateral headaches occurring on the right side This is been occurring for months    He uses tylenol up ot  to six a day some improvement however the headaches usually return  This seems to be consistent with  rebound headaches and chronic daily headaches  He did have an MRI of the brain which showed no evidence of new ischemia  We did consider the use of nortriptyline and or Elavil however he has a known history of cardiac dysfunction  Beta-blockers are relatively contraindicated  I did provide him with a left list of dietary restrictions for headaches as well as a list of headache triggers we have started him on Cymbalta 30 mg a day which may help both neuropathic pain and headaches  If the headaches persist we can also try Topamax or Depakote    He is to call for progress report in six weeks otherwise return to our offices in three months  Carlito Rg

## 2019-11-18 NOTE — ASSESSMENT & PLAN NOTE
He does have cubital tunnel syndrome that is non localizable  He continues to have numbness and tingling in the upper extremities  Placed another consult Orthopedics   I have reordered it since he continues to have more severe symptoms in the 4th and 5th digit on the right hand

## 2020-01-03 ENCOUNTER — TELEPHONE (OUTPATIENT)
Dept: NEUROLOGY | Facility: CLINIC | Age: 75
End: 2020-01-03

## 2020-01-03 NOTE — TELEPHONE ENCOUNTER
Pt daughter called to report pt was on cymbalta 30mg once daily  However he began to develop burning in his b/l hands within 1 week of starting and has now developed urinary incontinence  Due to this they discontinued Cymbalta yesterday  The burning has not yet improved but the urinary incontinence has a little  I did recommend that if urinary incontinence continues that they follow up with pt's PCP to rule out other cause such as UTI  She was agreeable  Any other recommendations?

## 2020-01-03 NOTE — TELEPHONE ENCOUNTER
He already had dysthesias prior to addition of cymbalta so it may take longer    Keep off of cymbalta but nothing else to do

## 2020-02-06 ENCOUNTER — TELEPHONE (OUTPATIENT)
Dept: NEUROLOGY | Facility: CLINIC | Age: 75
End: 2020-02-06

## 2020-02-10 ENCOUNTER — OFFICE VISIT (OUTPATIENT)
Dept: NEUROLOGY | Facility: CLINIC | Age: 75
End: 2020-02-10
Payer: MEDICARE

## 2020-02-10 ENCOUNTER — TELEPHONE (OUTPATIENT)
Dept: NEUROLOGY | Facility: CLINIC | Age: 75
End: 2020-02-10

## 2020-02-10 VITALS
HEART RATE: 69 BPM | HEIGHT: 68 IN | WEIGHT: 258.1 LBS | RESPIRATION RATE: 16 BRPM | BODY MASS INDEX: 39.12 KG/M2 | DIASTOLIC BLOOD PRESSURE: 68 MMHG | SYSTOLIC BLOOD PRESSURE: 132 MMHG

## 2020-02-10 DIAGNOSIS — G56.21 CUBITAL TUNNEL SYNDROME ON RIGHT: ICD-10-CM

## 2020-02-10 DIAGNOSIS — G60.8 MIXED SENSORY-MOTOR POLYNEUROPATHY: Primary | ICD-10-CM

## 2020-02-10 PROBLEM — G47.33 OBSTRUCTIVE SLEEP APNEA (ADULT) (PEDIATRIC): Status: ACTIVE | Noted: 2020-02-10

## 2020-02-10 PROCEDURE — 99214 OFFICE O/P EST MOD 30 MIN: CPT | Performed by: PSYCHIATRY & NEUROLOGY

## 2020-02-10 RX ORDER — TOPIRAMATE 25 MG/1
CAPSULE, COATED PELLETS ORAL
Qty: 60 CAPSULE | Refills: 1 | Status: SHIPPED | OUTPATIENT
Start: 2020-02-10 | End: 2020-06-15 | Stop reason: SINTOL

## 2020-02-10 NOTE — ASSESSMENT & PLAN NOTE
He has a persistent he has a neuropathy  This is resulting in numbness in his feet and dysesthesias in the upper extremities  He is on Lyrica  He is unable to tolerate higher doses due to side effects gabapentin was ineffective and duloxetine urinary is incontinence  He does inform me that his blood sugars have been abnormal and he is attempting to control  In the interim we have started him low-dose Topamax  This has been used off-label for neuropathic symptoms

## 2020-02-10 NOTE — PROGRESS NOTES
Patient ID: Soraya Acuna is a 66 y o  male  Assessment/Plan:    Mixed sensory-motor polyneuropathy  He has a persistent he has a neuropathy  This is resulting in numbness in his feet and dysesthesias in the upper extremities  He is on Lyrica  He is unable to tolerate higher doses due to side effects gabapentin was ineffective and duloxetine urinary is incontinence  He does inform me that his blood sugars have been abnormal and he is attempting to control  In the interim we have started him low-dose Topamax  This has been used off-label for neuropathic symptoms  Sixth nerve palsy of left eye  This is resolved  This was due to a diabetic etiology    Persistent headaches  I am he continues to have persistent daily headaches  A they do occur in the morning  He has taken Tylenol daily  Cymbalta did result in side effects  I have started him on Topamax low dose to 25 milligrams a day for one week then to increase to one tablet of the 25 twice a day  We discussed the common side effects  Can result in dysesthesias as well in cranberry juice can helpful  We discussed the common side effects of weight loss  If this does cause weight lost this then the symptoms of sleep apnea may improve  Obstructive sleep apnea (adult) (pediatric)  He does have known obstructive sleep apnea  He refuses to use the CPAP machine as it does result in problems this breathing  We discussed needing to see a sleep specialist at this point he is reluctant  I have informed her that this can result in early morning headaches and can  increased risk of eart attacks strokes  Cubital tunnel syndrome on right  He does have a history of c cubital tunnel syndrome  I did place an orthopedic evaluation in the past   He has not been called  I will place another consultation but I have have also asked my office to contact our offices directly         Diagnoses and all orders for this visit:    Mixed sensory-motor polyneuropathy  - topiramate (TOPAMAX) 25 mg sprinkle capsule; 1po daily for two weeks then 2 daily     He is to contact our offices in several weeks for progress report  If this is ineffective for causing side effects we could even try lower doses of Cymbalta at their 30 milligrams two or 3 times a week  Should Rich return to our offices in several months but call us if he has any other new questions or problems in his interim    Subjective: This is a 66  rh male with a history of diabetes and psoriatic arthritis presents in a follow up visit for known polyneuropathy   numbness and pain in hands and feet  He is currently on Lyrica 50 mg twice a day  Overall the swelling has improved  However he does complain of dysesthesias in the pads of his feet and intermittently in the right hand  The right hand numbness and tingling involving involves the last two 2 to 3 digits  He has difficulty with strength in the right hand and has coordination problems  He did undergo an EMG study  It was abnormal and did  demonstrate continued evidence of a polyneuropathy however there is absent on her right ulnar sensory nerve as well as abnormal dorsal ulnar cutaneous nerve  It was compared to the prior study in 2017 and there is some progression  It does reveal evidence of a mild ulnar non localizable neuropathy on the right below the elbow and a mild to moderate median nerve entrapment neuropathy at the right wrist    We did discussed orthopedic evaluation at that time but it was not pursued  In the interim he reports continued issues with right arm socially the 4th and 5th digit  I again ordered another orthopedic evaluation however he was not contacted by Orthopedics    After the last visit we started him on his duloxetine 30 milligrams a day  As did help the symptoms after two or three doses but he developed urinary incontinence at night  Subsequently the duloxetine was discontinued  The urinary symptomatology improved    He continues to have a sense of itchiness as dysesthesias in the upper extremities and the lower extremities he only has numbness  He is unable to tolerate high dose of Lyrica  He continues to have headaches  The headaches occur in the morning  They are bifrontal but worse on the right side  He takes at most two Tylenol a day  He does have a known history of sleep apnea but has been unable to tolerate the CPAP machines  Does snore on a regular basis  He has not followed up with Sleep Medicine  He does have excessive daytime sleepiness  Nortriptyline Elavil are contraindicated due to diabetes, cardiac condition  Beta-blockers are also contraindicated due to his known cardiac condition        He was  Admitted  for an acute a left 6th nerve palsy  Several montha ago   He did have an imaging studies performed  An MRA did demonstrate a basilar proximal narrowing  MRI brain without contrast and MRI of the orbits were negative for mass effect, recent infarct, intracranial hemorrhage, or hydrocephalus  White matter changes were noted likely secondary to moderate chronic microvascular ischemic disease  Focus of T1 signal abnormality was noted in the spinous process of C2 and complex right parotid lesion measuring up to 2 2 cm were noted  With felt to be more of a cyst related  He was diagnosed with cranial VI palsy and admitted for observation  MRA head 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  Diminished flow-related enhancement of the dominant right V4 segment of the vertebral artery relative to the nondominant hand was also noted  Neurology was consulted and suspected left 6th nerve palsy likely secondary to diabetic small vessel disease  In the interim he was also seen by his ophthalmologist t who felt that this may take several weeks to resolve  After six or seven months the double vision improved  He now continues on aspirin daily    A since his last visit he has had complete resolution of his symptomatology           three years ago he underwent right carpal tunnel release and cubital tunnel release which reduced the numbness in the palm           gabapentin was tried but it caused side effects   Higher doses of Lyrica did result in lower extremity swelling  He was unable to tolerate low doses of duloxetine  Other complaints include excessive thirst   He does have a known history of diabetes and is attempting to control his blood sugars         His walking has improved  The following portions of the patient's history were reviewed and updated as appropriate:   He  has a past medical history of CAD (coronary artery disease), Chronic kidney disease, History of prostate cancer, HTN (hypertension), Hyperlipidemia, Leukocytosis, Psoriatic arthritis ([address] ), SOB (shortness of breath), Syncope, Type 2 diabetes mellitus without complication, without long-term current use of insulin ([facility] ), and Weakness generalized  He  has a past surgical history that includes Carpal tunnel release (Bilateral, 2015); Elbow surgery (Right, 2015); Hernia repair; Shoulder arthroscopy; and Skin cancer excision (11/2017)  His family history includes Cancer in his mother; Heart disease in his father  He  reports that he has never smoked  He has never used smokeless tobacco  He reports that he does not drink alcohol or use drugs    Current Outpatient Medications   Medication Sig Dispense Refill    adalimumab (HUMIRA) 40 mg/0 8 mL PSKT Inject 40 mg under the skin every 14 (fourteen) days      amLODIPine-benazepril (LOTREL 5-10) 5-10 MG per capsule Take 1 capsule by mouth daily      Ascorbic Acid (VITAMIN C) 1000 MG tablet Take 1,000 mg by mouth daily      ASPIRIN 81 PO Take 81 mg by mouth daily      cyanocobalamin (VITAMIN B-12) 1,000 mcg tablet Take 500 mcg by mouth      doxazosin (CARDURA) 2 mg tablet Take 2 mg by mouth      folic acid (FOLVITE) 1 mg tablet Take 1 mg by mouth daily      furosemide (LASIX) 40 mg tablet Take 40 mg by mouth as needed       hydrochlorothiazide (HYDRODIURIL) 25 mg tablet Take 25 mg by mouth daily      insulin aspart (NovoLOG) 100 Units/mL injection pen Inject 16 units before lunch and 28 units before dinner      insulin glargine (BASAGLAR KWIKPEN) 100 units/mL injection pen 120 Units daily      isosorbide mononitrate (IMDUR) 30 mg 24 hr tablet Take 30 mg by mouth      metoprolol succinate (TOPROL-XL) 50 mg 24 hr tablet Take 50 mg by mouth      nitroglycerin (NITROSTAT) 0 4 mg SL tablet Place 0 4 mg under the tongue      potassium chloride (K-DUR,KLOR-CON) 20 mEq tablet Take 20 mEq by mouth      pregabalin (LYRICA) 50 mg capsule Take 1 capsule (50 mg total) by mouth 2 (two) times a day 3  Month supply 180 capsule 1    pyridoxine (VITAMIN B6) 100 mg tablet Take 100 mg by mouth daily      simvastatin (ZOCOR) 40 mg tablet Take 40 mg by mouth daily      torsemide (DEMADEX) 20 mg tablet Take 40 mg by mouth 2 (two) times a day      traMADol (ULTRAM) 50 mg tablet Take 60 mg by mouth every 6 (six) hours as needed      DULoxetine (CYMBALTA) 30 mg delayed release capsule Take 1 capsule (30 mg total) by mouth daily (Patient not taking: Reported on 2/10/2020) 30 capsule 2    Probiotic Product (PROBIOTIC-10 ULTIMATE PO) Take by mouth      topiramate (TOPAMAX) 25 mg sprinkle capsule 1po daily for two weeks then 2 daily 60 capsule 1     No current facility-administered medications for this visit  He is allergic to clindamycin; doxycycline; penicillins; fish oil; methotrexate; methotrexate derivatives; ranitidine; and sulfamethoxazole-trimethoprim            Objective:    Blood pressure 132/68, pulse 69, resp  rate 16, height 5' 8" (1 727 m), weight 117 kg (258 lb 1 6 oz)  Physical Exam   Constitutional: He appears well-developed  HENT:   Head: Normocephalic  Eyes: Pupils are equal, round, and reactive to light   Lids are normal    Neurological: He has normal strength  Reflex Scores:       Tricep reflexes are Tr on the right side and Tr on the left side  Bicep reflexes are 1+ on the right side and 1+ on the left side  Patellar reflexes are Tr on the right side and Tr on the left side  Achilles reflexes are Tr on the right side and Tr on the left side  Psychiatric: His speech is normal    Vitals reviewed  Neurological Exam  Mental Status   Oriented to person, place, time and situation  Speech is normal  Language is fluent with no aphasia  Cranial Nerves  CN II: Visual acuity is normal  Visual fields full to confrontation  CN III, IV, VI: Extraocular movements intact bilaterally  Normal lids and orbits bilaterally  Pupils equal round and reactive to light bilaterally  CN V: Facial sensation is normal   CN VII: Full and symmetric facial movement  CN VIII: Hearing is normal   CN IX, X: Palate elevates symmetrically  Normal gag reflex  CN XI: Shoulder shrug strength is normal   CN XII: Tongue midline without atrophy or fasciculations  Motor   Strength is 5/5 throughout all four extremities  Sensory  10 sec in hands, vibration to knees , temp to  Calf ,        , absent vibration  in toes, ankles  Is 3 sec , jps intact in  feet,         Reflexes                                           Right                      Left  Biceps                                 1+                         1+  Triceps                                Tr                         Tr  Patellar                                Tr                         Tr  Achilles                                Tr                         Tr  Plantar                           Downgoing                Downgoing    Right pathological reflexes: Essence's absent  Left pathological reflexes: Essence's absent  Coordination  Right: Finger-to-nose normal   Left: Finger-to-nose normal     Gait Unable to rise from chair without using arms  He has a wide-based gait    He was unable to tandem       Review of systems obtained from the medical assistant as below was reviewed with the patient at today's visit    ROS:    Review of Systems   Constitutional: Negative  Negative for appetite change and fever  HENT: Negative  Negative for hearing loss, tinnitus, trouble swallowing and voice change  Eyes: Negative  Negative for photophobia and pain  Respiratory: Negative  Negative for shortness of breath  Cardiovascular: Negative  Negative for palpitations  Gastrointestinal: Negative  Negative for nausea and vomiting  Endocrine: Negative  Negative for cold intolerance and heat intolerance  Genitourinary: Negative  Negative for dysuria, frequency and urgency  Musculoskeletal: Negative  Negative for myalgias and neck pain  Skin: Negative  Negative for rash  Allergic/Immunologic:        Itching in hands    Neurological: Negative  Negative for dizziness, tremors, seizures, syncope, facial asymmetry, speech difficulty, weakness, light-headedness, numbness and headaches  Hematological: Negative  Does not bruise/bleed easily  Psychiatric/Behavioral: Negative  Negative for confusion, hallucinations and sleep disturbance  All other systems reviewed and are negative

## 2020-02-10 NOTE — TELEPHONE ENCOUNTER
Please call orthopedic group  This is the 2nd time I have placed and referral to Orthopedics  it is for cubital tunnel syndrome

## 2020-02-10 NOTE — ASSESSMENT & PLAN NOTE
I am he continues to have persistent daily headaches  A they do occur in the morning  He has taken Tylenol daily  Cymbalta did result in side effects  I have started him on Topamax low dose to 25 milligrams a day for one week then to increase to one tablet of the 25 twice a day  We discussed the common side effects  Can result in dysesthesias as well in cranberry juice can helpful  We discussed the common side effects of weight loss  If this does cause weight lost this then the symptoms of sleep apnea may improve

## 2020-02-10 NOTE — ASSESSMENT & PLAN NOTE
He does have a history of c cubital tunnel syndrome  I did place an orthopedic evaluation in the past   He has not been called  I will place another consultation but I have have also asked my office to contact our offices directly

## 2020-03-05 ENCOUNTER — OFFICE VISIT (OUTPATIENT)
Dept: OBGYN CLINIC | Facility: CLINIC | Age: 75
End: 2020-03-05
Payer: MEDICARE

## 2020-03-05 VITALS
DIASTOLIC BLOOD PRESSURE: 77 MMHG | HEIGHT: 68 IN | SYSTOLIC BLOOD PRESSURE: 148 MMHG | BODY MASS INDEX: 37.89 KG/M2 | HEART RATE: 62 BPM | WEIGHT: 250 LBS

## 2020-03-05 DIAGNOSIS — G56.21 CUBITAL TUNNEL SYNDROME ON RIGHT: ICD-10-CM

## 2020-03-05 DIAGNOSIS — G56.01 CARPAL TUNNEL SYNDROME ON RIGHT: Primary | ICD-10-CM

## 2020-03-05 DIAGNOSIS — G62.9 POLYNEUROPATHY: ICD-10-CM

## 2020-03-05 PROCEDURE — 99203 OFFICE O/P NEW LOW 30 MIN: CPT | Performed by: SURGERY

## 2020-03-05 NOTE — PROGRESS NOTES
Amelia ARDON  Attending, Orthopaedic Surgery  Hand, Wrist, and Elbow Surgery  [facility]      ORTHOPAEDIC HAND, WRIST, AND ELBOW OFFICE  VISIT       ASSESSMENT/PLAN:      66 y o  male with right carpal tunnel syndrome, right ulnar neuropathy, not localizable  Significant polyneuropathy Rich's EMG's were reviewed with him in the office today  It was discussed that he may undergo a revision right carpal tunnel release, but that does not seem to be his main complaint  Based on the EMG Uyen Nuñez has polyneuropathy and the EMG was unable to  at which point his ulnar nerve is compressed  An ultrasound of his right ulnar nerve at the elbow was discussed today to try and determine if that is the point of compression  Even if a cubital tunnel release is performed, it is likely that his symptoms will not resolve due to the diabetic neuropathy and polyneuropathy  Uyen Nuñez declined the ultrasound at this time  He may benefit from seeing a neurologist for medical management of his symptoms  I will see him back in the office as needed if symptoms worsen or fail to improve  The patient verbalized understanding of exam findings and treatment plan  We engaged in the shared decision-making process and treatment options were discussed at length with the patient  Surgical and conservative management discussed today along with risks and benefits  Diagnoses and all orders for this visit:    Carpal tunnel syndrome on right    Cubital tunnel syndrome on right  -     Ambulatory referral to Orthopedic Surgery    Polyneuropathy      Follow Up:  Return if symptoms worsen or fail to improve      To Do Next Visit:  Re-evaluation of current issue    ____________________________________________________________________________________________________________________________________________      CHIEF COMPLAINT:  Chief Complaint   Patient presents with    Right Hand - Pain, Numbness       SUBJECTIVE:  Ute Hannah is a 66y o  year old RHD male who presents to the office today for right hand numbness and tingling  Jose Preston was referred to the office by his PCP, Dr Margarita Preston states that he has been experiencing numbness and tingling to his ulnar 2 digits for multiple years  He states that in 2014 he underwent a right carpal tunnel and cubital tunnel release, performed at [facility]  He states that he did not get any relief in his symptoms following surgery  He is not waking up from sleep at night due to the numbness and tingling  He has not tried bracing or therapy at this time  He has tried Cymbalta, which was beneficial for him, but he had to stop the medication due to constipation and hives        Pain/symptom timing:  Worse during the day when active  Pain/symptom context:  Worse with activites and work  Pain/symptom modifying factors:  Rest makes better, activities make worse  Pain/symptom associated signs/symptoms: none    Prior treatment   · NSAIDsNo   · Injections No   · Bracing/Orthotics No    Physical Therapy No     I have personally reviewed all the relevant PMH, PSH, SH, FH, Medications and allergies      PAST MEDICAL HISTORY:  Past Medical History:   Diagnosis Date    CAD (coronary artery disease)     Chronic kidney disease     History of prostate cancer     HTN (hypertension)     Hyperlipidemia     Leukocytosis     Psoriatic arthritis (HCC)     SOB (shortness of breath)     Syncope     Type 2 diabetes mellitus without complication, without long-term current use of insulin (HCC)     Weakness generalized        PAST SURGICAL HISTORY:  Past Surgical History:   Procedure Laterality Date    CARPAL TUNNEL RELEASE Bilateral 2015    ELBOW SURGERY Right 2015    HERNIA REPAIR      x2    SHOULDER ARTHROSCOPY      Rotator cuff     SKIN CANCER EXCISION  11/2017       FAMILY HISTORY:  Family History   Problem Relation Age of Onset    Heart disease Father     Cancer Mother        SOCIAL HISTORY:  Social History Tobacco Use    Smoking status: Never Smoker    Smokeless tobacco: Never Used   Substance Use Topics    Alcohol use: No    Drug use: No       MEDICATIONS:    Current Outpatient Medications:     adalimumab (HUMIRA) 40 mg/0 8 mL PSKT, Inject 40 mg under the skin every 14 (fourteen) days, Disp: , Rfl:     amLODIPine-benazepril (LOTREL 5-10) 5-10 MG per capsule, Take 1 capsule by mouth daily, Disp: , Rfl:     Ascorbic Acid (VITAMIN C) 1000 MG tablet, Take 1,000 mg by mouth daily, Disp: , Rfl:     ASPIRIN 81 PO, Take 81 mg by mouth daily, Disp: , Rfl:     cyanocobalamin (VITAMIN B-12) 1,000 mcg tablet, Take 500 mcg by mouth, Disp: , Rfl:     folic acid (FOLVITE) 1 mg tablet, Take 1 mg by mouth daily, Disp: , Rfl:     hydrochlorothiazide (HYDRODIURIL) 25 mg tablet, Take 25 mg by mouth daily, Disp: , Rfl:     insulin aspart (NovoLOG) 100 Units/mL injection pen, Inject 16 units before lunch and 28 units before dinner, Disp: , Rfl:     insulin glargine (BASAGLAR KWIKPEN) 100 units/mL injection pen, 120 Units daily, Disp: , Rfl:     pregabalin (LYRICA) 50 mg capsule, Take 1 capsule (50 mg total) by mouth 2 (two) times a day 3  Month supply, Disp: 180 capsule, Rfl: 1    pyridoxine (VITAMIN B6) 100 mg tablet, Take 100 mg by mouth daily, Disp: , Rfl:     simvastatin (ZOCOR) 40 mg tablet, Take 40 mg by mouth daily, Disp: , Rfl:     torsemide (DEMADEX) 20 mg tablet, Take 40 mg by mouth 2 (two) times a day, Disp: , Rfl:     traMADol (ULTRAM) 50 mg tablet, Take 60 mg by mouth every 6 (six) hours as needed, Disp: , Rfl:     doxazosin (CARDURA) 2 mg tablet, Take 2 mg by mouth, Disp: , Rfl:     DULoxetine (CYMBALTA) 30 mg delayed release capsule, Take 1 capsule (30 mg total) by mouth daily (Patient not taking: Reported on 2/10/2020), Disp: 30 capsule, Rfl: 2    furosemide (LASIX) 40 mg tablet, Take 40 mg by mouth as needed , Disp: , Rfl:     isosorbide mononitrate (IMDUR) 30 mg 24 hr tablet, Take 30 mg by mouth, Disp: , Rfl:     metoprolol succinate (TOPROL-XL) 50 mg 24 hr tablet, Take 50 mg by mouth, Disp: , Rfl:     nitroglycerin (NITROSTAT) 0 4 mg SL tablet, Place 0 4 mg under the tongue, Disp: , Rfl:     potassium chloride (K-DUR,KLOR-CON) 20 mEq tablet, Take 20 mEq by mouth, Disp: , Rfl:     Probiotic Product (PROBIOTIC-10 ULTIMATE PO), Take by mouth, Disp: , Rfl:     topiramate (TOPAMAX) 25 mg sprinkle capsule, 1po daily for two weeks then 2 daily, Disp: 60 capsule, Rfl: 1    ALLERGIES:  Allergies   Allergen Reactions    Clindamycin Anaphylaxis    Doxycycline Anaphylaxis     Hospitalized  Hospitalized  Hospitalized  Hospitalized    Penicillins Anaphylaxis, Rash and Other (See Comments)     Other reaction(s): Other (see comments)      Fish Oil Hives    Methotrexate      toxicity    Methotrexate Derivatives Other (See Comments)     toxicity  toxicity  toxicity  toxicity  toxicity  toxicity    Other reaction(s): Other (see comments)  toxicity  toxicity  toxicity      Ranitidine Other (See Comments)     Other reaction(s): Other (see comments)      Sulfamethoxazole-Trimethoprim Other (See Comments)     Patient states that he was hospitalized after taking this medication  Unable to sit and stand  Patient states that he was hospitalized after taking this medication  Unable to sit and stand  Patient states that he was hospitalized after taking this medication  Unable to sit and stand  Patient states that he was hospitalized after taking this medication  Unable to sit and stand  Other reaction(s): Other (see comments)  Patient states that he was hospitalized after taking this medication  Unable to sit and stand  Patient states that he was hospitalized after taking this medication  Unable to sit and stand  REVIEW OF SYSTEMS:  Review of Systems   Constitutional: Negative for chills, fever and unexpected weight change  HENT: Negative for hearing loss, nosebleeds and sore throat  Eyes: Negative for pain, redness and visual disturbance  Respiratory: Negative for cough, shortness of breath and wheezing  Cardiovascular: Negative for chest pain, palpitations and leg swelling  Gastrointestinal: Negative for abdominal pain, nausea and vomiting  Endocrine: Negative for polydipsia and polyuria  Genitourinary: Negative for difficulty urinating and hematuria  Musculoskeletal: Negative for arthralgias, joint swelling and myalgias  Skin: Negative for rash and wound  Neurological: Positive for numbness  Negative for dizziness and headaches  Psychiatric/Behavioral: Negative for decreased concentration, dysphoric mood and suicidal ideas  The patient is not nervous/anxious  VITALS:  Vitals:    03/05/20 0958   BP: 148/77   Pulse: 62       LABS:  HgA1c:   Lab Results   Component Value Date    HGBA1C 6 6 (H) 10/14/2015     BMP:   Lab Results   Component Value Date    GLUCOSE 109 10/29/2015    CALCIUM 8 6 10/29/2015     10/29/2015    K 4 2 10/29/2015    CO2 25 3 10/29/2015     10/29/2015    BUN 24 10/29/2015    CREATININE 1 23 10/29/2015       _____________________________________________________  PHYSICAL EXAMINATION:  General: well developed and well nourished, alert, oriented times 3 and appears comfortable  Psychiatric: Normal  HEENT: Normocephalic, Atraumatic Trachea Midline, No torticollis  Pulmonary: No audible wheezing or respiratory distress   Cardiovascular: No pitting edema, 2+ radial pulse   Skin: No masses, erythema, lacerations, fluctation, ulcerations  Neurovascular: Sensation Intact to the Median, Ulnar, Radial Nerve, Motor Intact to the Median, Ulnar, Radial Nerve and Pulses Intact  Musculoskeletal: Normal, except as noted in detailed exam and in HPI  MUSCULOSKELETAL EXAMINATION:    Right Ulnar Nerve Exam:  Negative intrinsic atrophy  Negative  deformity at the elbow  Full range of motion with flexion and extension of the elbow     Negative ulnar nerve compression test at the elbow  Negative tinels over the ulnar nerve at the elbow  Able to cross fingers, slight slower when compared to the left side  De Quervain's Tenosynovitis Exam:  right side  Negative tender to palpation over 1st dorsal extensor compartment   Negative palpable nodule  Negative crepitus over 1st dorsal extensor compartment   Negative Finkelstein's test    Negative pain with resisted abduction of the thumb    ___________________________________________________  STUDIES REVIEWED:  I personally reviewed EMG is from 2017 and 2016 which demonstrate a significant polyneuropathy and progression of carpal tunnel from mild to moderate from the 2017 to 2019 EMG     Evidence of ulnar neuropathy however without a specific location      PROCEDURES PERFORMED:  Procedures  No Procedures performed today    _____________________________________________________      Jie Jordan    I,:   Yovana Bedoya am acting as a scribe while in the presence of the attending physician :        I,:   Imani Banks MD personally performed the services described in this documentation    as scribed in my presence :

## 2020-06-15 ENCOUNTER — OFFICE VISIT (OUTPATIENT)
Dept: NEUROLOGY | Facility: CLINIC | Age: 75
End: 2020-06-15
Payer: MEDICARE

## 2020-06-15 VITALS
SYSTOLIC BLOOD PRESSURE: 160 MMHG | BODY MASS INDEX: 39.45 KG/M2 | TEMPERATURE: 98.9 F | HEART RATE: 72 BPM | DIASTOLIC BLOOD PRESSURE: 75 MMHG | WEIGHT: 260.3 LBS | HEIGHT: 68 IN

## 2020-06-15 DIAGNOSIS — E10.42 DIABETIC POLYNEUROPATHY ASSOCIATED WITH TYPE 1 DIABETES MELLITUS (HCC): Primary | ICD-10-CM

## 2020-06-15 DIAGNOSIS — G60.8 MIXED SENSORY-MOTOR POLYNEUROPATHY: ICD-10-CM

## 2020-06-15 PROCEDURE — 99214 OFFICE O/P EST MOD 30 MIN: CPT | Performed by: PSYCHIATRY & NEUROLOGY

## 2020-06-15 RX ORDER — LAMOTRIGINE 25 MG/1
TABLET ORAL
Qty: 60 TABLET | Refills: 1 | Status: SHIPPED | OUTPATIENT
Start: 2020-06-15 | End: 2020-08-11

## 2020-06-15 RX ORDER — LIDOCAINE 50 MG/G
3 PATCH TOPICAL DAILY
Qty: 270 PATCH | Refills: 1 | Status: SHIPPED | OUTPATIENT
Start: 2020-06-15

## 2020-06-23 ENCOUNTER — TELEPHONE (OUTPATIENT)
Dept: NEUROLOGY | Facility: CLINIC | Age: 75
End: 2020-06-23

## 2020-07-23 DIAGNOSIS — G60.8 MIXED SENSORY-MOTOR POLYNEUROPATHY: ICD-10-CM

## 2020-07-24 RX ORDER — PREGABALIN 50 MG/1
50 CAPSULE ORAL 2 TIMES DAILY
Qty: 180 CAPSULE | Refills: 1 | Status: SHIPPED | OUTPATIENT
Start: 2020-07-24

## 2020-08-09 DIAGNOSIS — E10.42 DIABETIC POLYNEUROPATHY ASSOCIATED WITH TYPE 1 DIABETES MELLITUS (HCC): ICD-10-CM

## 2020-08-11 RX ORDER — LAMOTRIGINE 25 MG/1
TABLET ORAL
Qty: 60 TABLET | Refills: 3 | Status: SHIPPED | OUTPATIENT
Start: 2020-08-11

## 2020-10-12 ENCOUNTER — TELEPHONE (OUTPATIENT)
Dept: NEUROLOGY | Facility: CLINIC | Age: 75
End: 2020-10-12

## 2020-10-23 ENCOUNTER — TELEPHONE (OUTPATIENT)
Dept: NEUROLOGY | Facility: CLINIC | Age: 75
End: 2020-10-23

## 2020-11-13 ENCOUNTER — TELEPHONE (OUTPATIENT)
Dept: NEUROLOGY | Facility: CLINIC | Age: 75
End: 2020-11-13

## 2021-02-12 DIAGNOSIS — Z23 ENCOUNTER FOR IMMUNIZATION: ICD-10-CM

## 2021-07-16 ENCOUNTER — TELEPHONE (OUTPATIENT)
Dept: NEUROLOGY | Facility: CLINIC | Age: 76
End: 2021-07-16

## 2021-07-16 NOTE — TELEPHONE ENCOUNTER
Patient's daughter is requesting that patient needs to see Dr Cherisse Burkitt instead of Dr Ingrid Boston  Patient's daughter does not want patient to see Dr Ingrid Boston because of scheduling conflicts  Daughter states she spoke to Dr Cherisse Burkitt and he said he will see patient  Please advise  Thank you

## 2021-07-16 NOTE — TELEPHONE ENCOUNTER
Helga Sanchez, daughter reported that her father had neuropathy and left asked if I treated neuropathy and I told her that I do    Will be happy to see him

## 2021-12-02 ENCOUNTER — TELEPHONE (OUTPATIENT)
Dept: NEUROLOGY | Facility: CLINIC | Age: 76
End: 2021-12-02

## 2021-12-06 ENCOUNTER — OFFICE VISIT (OUTPATIENT)
Dept: NEUROLOGY | Facility: CLINIC | Age: 76
End: 2021-12-06
Payer: MEDICARE

## 2021-12-06 VITALS
HEIGHT: 68 IN | DIASTOLIC BLOOD PRESSURE: 80 MMHG | WEIGHT: 257 LBS | BODY MASS INDEX: 38.95 KG/M2 | SYSTOLIC BLOOD PRESSURE: 126 MMHG | HEART RATE: 78 BPM | TEMPERATURE: 97.3 F

## 2021-12-06 DIAGNOSIS — E10.42 DIABETIC POLYNEUROPATHY ASSOCIATED WITH TYPE 1 DIABETES MELLITUS (HCC): Primary | ICD-10-CM

## 2021-12-06 PROCEDURE — 99215 OFFICE O/P EST HI 40 MIN: CPT | Performed by: PSYCHIATRY & NEUROLOGY

## 2021-12-06 RX ORDER — LAMOTRIGINE 25 MG/1
50 TABLET ORAL DAILY
Qty: 120 TABLET | Refills: 5 | Status: SHIPPED | OUTPATIENT
Start: 2021-12-06 | End: 2022-04-08 | Stop reason: SDUPTHER

## 2022-04-01 ENCOUNTER — TELEPHONE (OUTPATIENT)
Dept: NEUROLOGY | Facility: CLINIC | Age: 77
End: 2022-04-01

## 2022-04-08 ENCOUNTER — OFFICE VISIT (OUTPATIENT)
Dept: NEUROLOGY | Facility: CLINIC | Age: 77
End: 2022-04-08
Payer: MEDICARE

## 2022-04-08 VITALS
TEMPERATURE: 98.2 F | SYSTOLIC BLOOD PRESSURE: 122 MMHG | HEART RATE: 72 BPM | HEIGHT: 68 IN | WEIGHT: 258 LBS | BODY MASS INDEX: 39.1 KG/M2 | DIASTOLIC BLOOD PRESSURE: 86 MMHG

## 2022-04-08 DIAGNOSIS — E10.42 DIABETIC POLYNEUROPATHY ASSOCIATED WITH TYPE 1 DIABETES MELLITUS (HCC): Primary | ICD-10-CM

## 2022-04-08 PROCEDURE — 99213 OFFICE O/P EST LOW 20 MIN: CPT | Performed by: PSYCHIATRY & NEUROLOGY

## 2022-04-08 RX ORDER — LAMOTRIGINE 25 MG/1
TABLET ORAL
Qty: 120 TABLET | Refills: 5 | Status: SHIPPED | OUTPATIENT
Start: 2022-04-08

## 2022-04-08 RX ORDER — CALCITRIOL 0.25 UG/1
0.25 CAPSULE, LIQUID FILLED ORAL DAILY
COMMUNITY

## 2022-04-08 NOTE — PROGRESS NOTES
Jazz Sellers is a 64 y o  male returns in follow-up today with history of peripheral neuropathy    Assessment:  1  Diabetic polyneuropathy associated with type 1 diabetes mellitus (HCC)        Plan:  Continue Lyrica 50 mg twice daily   Increase Lamictal 50 mg twice daily   Follow-up 3 month    Discussion:  Talia Ferguson and his daughter did not understand that they were to increase his Lamictal dose up to 50 mg twice daily  He will do this in hopes that his neuropathic pain symptoms improve  If he continues to have issues but tolerates the medicine will anticipate increasing the dose  We also discussed possible use of over-the-counter topical nerve pain creams  I will see him back in 3 months      Subjective:    HPI  Talia Ferguson returns in follow-up today  Neither he nor his daughter who accompanied her today remembers me advising them to increase the Lamictal dose up to 50 mg twice daily  He continues to have issues of neuropathic pain in his hands and feet and remains on Lyrica 50 mg twice daily and has not changed his Lamictal 25 mg twice daily  He has adverse effects with higher dose of Lyrica as well as with Topamax and Cymbalta  He is aware of some issues with his balance but has not had any falls    He does use his straight cane much of the time when he is outside      Past Medical History:   Diagnosis Date    CAD (coronary artery disease)     Chronic kidney disease     History of prostate cancer     HTN (hypertension)     Hyperlipidemia     Leukocytosis     Psoriatic arthritis (HCC)     SOB (shortness of breath)     Syncope     Type 2 diabetes mellitus without complication, without long-term current use of insulin (HCC)     Weakness generalized        Family History:  Family History   Problem Relation Age of Onset    Heart disease Father     Cancer Mother        Past Surgical History:  Past Surgical History:   Procedure Laterality Date    CARPAL TUNNEL RELEASE Bilateral 2015    CATARACT EXTRACTION W/ INTRAOCULAR LENS IMPLANT      both eyes    CHOLECYSTECTOMY  03/2022    ELBOW SURGERY Right 2015    HERNIA REPAIR      x2    SHOULDER ARTHROSCOPY      Rotator cuff     SKIN CANCER EXCISION  11/2017       Social History:   reports that he has never smoked  He has never used smokeless tobacco  He reports that he does not drink alcohol and does not use drugs      Allergies:  Clindamycin, Doxycycline, Penicillins, Fish oil - food allergy, Methotrexate, Methotrexate derivatives, Ranitidine, and Sulfamethoxazole-trimethoprim      Current Outpatient Medications:     adalimumab (HUMIRA) 40 mg/0 8 mL PSKT, Inject 40 mg under the skin every 14 (fourteen) days, Disp: , Rfl:     Ascorbic Acid (VITAMIN C) 1000 MG tablet, Take 1,000 mg by mouth daily, Disp: , Rfl:     ASPIRIN 81 PO, Take 81 mg by mouth daily, Disp: , Rfl:     calcitriol (ROCALTROL) 0 25 mcg capsule, Take 0 25 mcg by mouth daily, Disp: , Rfl:     cyanocobalamin (VITAMIN B-12) 1,000 mcg tablet, Take 500 mcg by mouth, Disp: , Rfl:     doxazosin (CARDURA) 2 mg tablet, Take 2 mg by mouth, Disp: , Rfl:     folic acid (FOLVITE) 1 mg tablet, Take 1 mg by mouth daily, Disp: , Rfl:     insulin aspart (NovoLOG) 100 Units/mL injection pen, Inject 20 units for lunch and 40 units for dinner, Disp: , Rfl:     insulin glargine (BASAGLAR KWIKPEN) 100 units/mL injection pen, 120 Units daily, Disp: , Rfl:     isosorbide mononitrate (IMDUR) 30 mg 24 hr tablet, Take 30 mg by mouth, Disp: , Rfl:     lamoTRIgine (LaMICtal) 25 mg tablet, Take 2 tablets (50 mg total) by mouth daily, Disp: 120 tablet, Rfl: 5    lidocaine (LIDODERM) 5 %, Apply 3 patches topically daily Remove & Discard patch within 12 hours or as directed by MD, Disp: 270 patch, Rfl: 1    metoprolol succinate (TOPROL-XL) 50 mg 24 hr tablet, Take 50 mg by mouth, Disp: , Rfl:     nitroglycerin (NITROSTAT) 0 4 mg SL tablet, Place 0 4 mg under the tongue every 5 (five) minutes as needed , Disp: , Rfl:    potassium chloride (K-DUR,KLOR-CON) 20 mEq tablet, Take 20 mEq by mouth, Disp: , Rfl:     pregabalin (LYRICA) 50 mg capsule, Take 1 capsule (50 mg total) by mouth 2 (two) times a day 3  Month supply, Disp: 180 capsule, Rfl: 1    pyridoxine (VITAMIN B6) 100 mg tablet, Take 100 mg by mouth daily, Disp: , Rfl:     simvastatin (ZOCOR) 40 mg tablet, Take 40 mg by mouth daily, Disp: , Rfl:     torsemide (DEMADEX) 20 mg tablet, Take 40 mg by mouth 3 (three) times a day  , Disp: , Rfl:     traMADol (ULTRAM) 50 mg tablet, Take 60 mg by mouth every 6 (six) hours as needed, Disp: , Rfl:     amLODIPine-benazepril (LOTREL 5-10) 5-10 MG per capsule, Take 1 capsule by mouth daily (Patient not taking: Reported on 12/6/2021 ), Disp: , Rfl:     furosemide (LASIX) 40 mg tablet, Take 40 mg by mouth as needed , Disp: , Rfl:     hydrochlorothiazide (HYDRODIURIL) 25 mg tablet, Take 25 mg by mouth daily (Patient not taking: Reported on 12/6/2021 ), Disp: , Rfl:     lamoTRIgine (LaMICtal) 25 mg tablet, take one tablet by mouth daily for 1 week then increase to one tablet twice daily (Patient not taking: Reported on 4/8/2022 ), Disp: 60 tablet, Rfl: 3    Probiotic Product (PROBIOTIC-10 ULTIMATE PO), Take by mouth, Disp: , Rfl:     I have reviewed the past medical, social and family history, current medications, allergies, vitals, review of systems and updated this information as appropriate today     Objective:    Vitals:  Blood pressure 122/86, pulse 72, temperature 98 2 °F (36 8 °C), temperature source Temporal, height 5' 8" (1 727 m), weight 117 kg (258 lb)  Physical Exam    Neurological Exam  GENERAL:  Well-developed well-nourished man in no acute distress  HEENT/NECK: Head is atraumatic normocephalic, neck is supple  NEUROLOGIC:  Mental Status: Awake and alert without aphasia  Cranial Nerves: Extraocular movements are full   Face is symmetrical  Coordination:  Gait is stable with a straight cane            ROS:    Review of Systems   Constitutional: Negative  Negative for appetite change and fever  HENT: Negative  Negative for hearing loss, tinnitus, trouble swallowing and voice change  Eyes: Negative  Negative for photophobia and pain  Cardiovascular: Positive for palpitations  Gastrointestinal: Negative  Negative for nausea and vomiting  Endocrine: Negative  Negative for cold intolerance  Genitourinary: Negative  Negative for dysuria, frequency and urgency  Musculoskeletal: Negative  Negative for myalgias and neck pain  Skin: Negative  Negative for rash  Neurological: Positive for light-headedness, numbness and headaches  Negative for dizziness, tremors, seizures, syncope, facial asymmetry, speech difficulty and weakness  Patient states headaches daily on the right side of head  Patient states numbness/pain in both hands and feet   Hematological: Negative  Does not bruise/bleed easily  Psychiatric/Behavioral: Negative  Negative for confusion, hallucinations and sleep disturbance

## 2022-08-04 ENCOUNTER — TELEPHONE (OUTPATIENT)
Dept: NEUROLOGY | Facility: CLINIC | Age: 77
End: 2022-08-04

## 2022-08-10 ENCOUNTER — OFFICE VISIT (OUTPATIENT)
Dept: NEUROLOGY | Facility: CLINIC | Age: 77
End: 2022-08-10
Payer: MEDICARE

## 2022-08-10 VITALS
WEIGHT: 256 LBS | SYSTOLIC BLOOD PRESSURE: 120 MMHG | DIASTOLIC BLOOD PRESSURE: 86 MMHG | HEART RATE: 86 BPM | BODY MASS INDEX: 38.8 KG/M2 | OXYGEN SATURATION: 96 % | HEIGHT: 68 IN

## 2022-08-10 DIAGNOSIS — E10.42 DIABETIC POLYNEUROPATHY ASSOCIATED WITH TYPE 1 DIABETES MELLITUS (HCC): Primary | ICD-10-CM

## 2022-08-10 PROCEDURE — 99214 OFFICE O/P EST MOD 30 MIN: CPT | Performed by: PSYCHIATRY & NEUROLOGY

## 2022-08-10 RX ORDER — OXCARBAZEPINE 300 MG/1
300 TABLET, FILM COATED ORAL EVERY 12 HOURS SCHEDULED
Qty: 60 TABLET | Refills: 5 | Status: SHIPPED | OUTPATIENT
Start: 2022-08-10

## 2022-08-10 NOTE — PROGRESS NOTES
Ismael Goodson is a 64 y o  male returns follow-up today with history of peripheral neuropathy and pain    Assessment:  1  Diabetic polyneuropathy associated with type 1 diabetes mellitus (HCC)        Plan:  Continue Lyrica  Initiate Trileptal 150 mg titrating to 300 mg twice daily  Follow-up couple of months    Discussion:  Mai Vieyra reports some increased neuropathic pain symptoms especially in the left foot  He did not notice any improvement with increasing the dose of Lamictal   Have recommended discontinuing Lamictal and initiating Trileptal starting at 150 mg daily  If after week is tolerating it but not finding it effective may increase the dose to 300 mg uneven 450 if necessary twice daily  We discussed potential adverse effects of the medication  I will see him back in follow-up in a few months      Subjective:    HPI  Mai Vieyra returns in follow-up today accompanied by his daughter  He reports no improvement in his symptoms of neuropathic pain with increasing the dose of Lamictal and in fact feels that the pain symptoms or even a little worse  He notices issues with his balance and ambulates with a straight cane  He has not had any falls  He remains on Lyrica but not able to take higher doses due to tolerance was also not able to tolerate Topamax or Cymbalta        Past Medical History:   Diagnosis Date    CAD (coronary artery disease)     Chronic kidney disease     History of prostate cancer     HTN (hypertension)     Hyperlipidemia     Leukocytosis     Psoriatic arthritis (HCC)     SOB (shortness of breath)     Syncope     Type 2 diabetes mellitus without complication, without long-term current use of insulin (HCC)     Weakness generalized        Family History:  Family History   Problem Relation Age of Onset    Heart disease Father     Cancer Mother        Past Surgical History:  Past Surgical History:   Procedure Laterality Date    CARPAL TUNNEL RELEASE Bilateral 2015    CATARACT EXTRACTION W/ INTRAOCULAR LENS IMPLANT      both eyes    CHOLECYSTECTOMY  03/2022    ELBOW SURGERY Right 2015    HERNIA REPAIR      x2    SHOULDER ARTHROSCOPY      Rotator cuff     SKIN CANCER EXCISION  11/2017       Social History:   reports that he has never smoked  He has never used smokeless tobacco  He reports that he does not drink alcohol and does not use drugs      Allergies:  Clindamycin, Doxycycline, Penicillins, Fish oil - food allergy, Methotrexate, Methotrexate derivatives, Ranitidine, and Sulfamethoxazole-trimethoprim      Current Outpatient Medications:     adalimumab (HUMIRA) 40 mg/0 8 mL PSKT, Inject 40 mg under the skin every 14 (fourteen) days, Disp: , Rfl:     Ascorbic Acid (VITAMIN C) 1000 MG tablet, Take 1,000 mg by mouth daily, Disp: , Rfl:     ASPIRIN 81 PO, Take 81 mg by mouth daily, Disp: , Rfl:     calcitriol (ROCALTROL) 0 25 mcg capsule, Take 0 25 mcg by mouth daily, Disp: , Rfl:     cyanocobalamin (VITAMIN B-12) 1,000 mcg tablet, Take 500 mcg by mouth, Disp: , Rfl:     folic acid (FOLVITE) 1 mg tablet, Take 1 mg by mouth daily, Disp: , Rfl:     insulin aspart (NovoLOG) 100 Units/mL injection pen, Inject 20 units for lunch and 40 units for dinner, Disp: , Rfl:     insulin glargine (LANTUS SOLOSTAR) 100 units/mL injection pen, 120 Units daily, Disp: , Rfl:     lamoTRIgine (LaMICtal) 25 mg tablet, 2 p o  b i d , Disp: 120 tablet, Rfl: 5    lidocaine (LIDODERM) 5 %, Apply 3 patches topically daily Remove & Discard patch within 12 hours or as directed by MD, Disp: 270 patch, Rfl: 1    OXcarbazepine (TRILEPTAL) 300 mg tablet, Take 1 tablet (300 mg total) by mouth every 12 (twelve) hours, Disp: 60 tablet, Rfl: 5    pregabalin (LYRICA) 50 mg capsule, Take 1 capsule (50 mg total) by mouth 2 (two) times a day 3  Month supply, Disp: 180 capsule, Rfl: 1    pyridoxine (VITAMIN B6) 100 mg tablet, Take 100 mg by mouth daily, Disp: , Rfl:     simvastatin (ZOCOR) 40 mg tablet, Take 40 mg by mouth daily, Disp: , Rfl:     torsemide (DEMADEX) 20 mg tablet, Take 40 mg by mouth 3 (three) times a day  , Disp: , Rfl:     traMADol (ULTRAM) 50 mg tablet, Take 60 mg by mouth every 6 (six) hours as needed, Disp: , Rfl:     amLODIPine-benazepril (LOTREL 5-10) 5-10 MG per capsule, Take 1 capsule by mouth daily (Patient not taking: No sig reported), Disp: , Rfl:     doxazosin (CARDURA) 2 mg tablet, Take 2 mg by mouth, Disp: , Rfl:     furosemide (LASIX) 40 mg tablet, Take 40 mg by mouth as needed , Disp: , Rfl:     hydrochlorothiazide (HYDRODIURIL) 25 mg tablet, Take 25 mg by mouth daily (Patient not taking: No sig reported), Disp: , Rfl:     isosorbide mononitrate (IMDUR) 30 mg 24 hr tablet, Take 30 mg by mouth, Disp: , Rfl:     lamoTRIgine (LaMICtal) 25 mg tablet, take one tablet by mouth daily for 1 week then increase to one tablet twice daily (Patient not taking: No sig reported), Disp: 60 tablet, Rfl: 3    metoprolol succinate (TOPROL-XL) 50 mg 24 hr tablet, Take 50 mg by mouth, Disp: , Rfl:     nitroglycerin (NITROSTAT) 0 4 mg SL tablet, Place 0 4 mg under the tongue every 5 (five) minutes as needed , Disp: , Rfl:     potassium chloride (K-DUR,KLOR-CON) 20 mEq tablet, Take 20 mEq by mouth, Disp: , Rfl:     Probiotic Product (PROBIOTIC-10 ULTIMATE PO), Take by mouth (Patient not taking: Reported on 8/10/2022), Disp: , Rfl:     I have reviewed the past medical, social and family history, current medications, allergies, vitals, review of systems and updated this information as appropriate today     Objective:    Vitals:  Blood pressure 120/86, pulse 86, height 5' 8" (1 727 m), weight 116 kg (256 lb), SpO2 96 %  Physical Exam    Neurological Exam  GENERAL:  Well-developed well-nourished man in no acute distress  HEENT/NECK: Head is atraumatic normocephalic, neck is supple  NEUROLOGIC:  Mental Status: Awake and alert without aphasia  Cranial Nerves: Extraocular movements are full   Face is symmetrical  Coordination:  Gait is stable with a straight cane            ROS:    Review of Systems   Constitutional: Negative  Negative for appetite change and fever  HENT: Positive for hearing loss  Negative for tinnitus, trouble swallowing and voice change  Patient states he is hard of hearing in left ear  Eyes: Negative  Negative for photophobia and pain  Respiratory: Negative  Negative for shortness of breath  Cardiovascular: Negative  Negative for palpitations  Gastrointestinal: Negative  Negative for nausea and vomiting  Endocrine: Negative  Negative for cold intolerance  Genitourinary: Negative  Negative for dysuria, frequency and urgency  Musculoskeletal: Positive for gait problem  Negative for myalgias and neck pain  Patient states pain in his feet make it difficult to be mobile  Skin: Negative  Negative for rash  Neurological: Negative for dizziness, tremors, seizures, syncope, facial asymmetry, speech difficulty, weakness, light-headedness, numbness and headaches  Hematological: Negative  Does not bruise/bleed easily  Psychiatric/Behavioral: Negative  Negative for confusion, hallucinations and sleep disturbance

## 2022-09-30 ENCOUNTER — TELEPHONE (OUTPATIENT)
Dept: NEUROLOGY | Facility: CLINIC | Age: 77
End: 2022-09-30

## 2023-03-25 DIAGNOSIS — E10.42 DIABETIC POLYNEUROPATHY ASSOCIATED WITH TYPE 1 DIABETES MELLITUS (HCC): ICD-10-CM

## 2023-03-27 RX ORDER — LAMOTRIGINE 25 MG/1
TABLET ORAL
Qty: 120 TABLET | Refills: 0 | Status: SHIPPED | OUTPATIENT
Start: 2023-03-27

## 2023-04-22 NOTE — PROGRESS NOTES
Patient ID: Michell Valdes is a 74 y o  male  Assessment/Plan:    Mixed sensory-motor polyneuropathy     His neuropathy symptoms are somewhat stable  His dose was decreased to 75 /  50 several months ago  due to the fact that he has no burning dysesthesias but continues to have some lower extremity swelling I have  decreased it further to 50 mg twice a day  This is the most likely cause of neuropathy is his known history of diabetes  Overall his exam is stable  Follow he is to follow up in approximately six months  Diagnoses and all orders for this visit:    Mixed sensory-motor polyneuropathy  -     pregabalin (LYRICA) 50 mg capsule; Take 1 capsule (50 mg total) by mouth 2 (two) times a day 3  Month supply    Other orders  -     hydrALAZINE (APRESOLINE) 50 mg tablet; Take 50 mg by mouth  -     isosorbide mononitrate (IMDUR) 30 mg 24 hr tablet; Take 30 mg by mouth  -     metoprolol succinate (TOPROL-XL) 50 mg 24 hr tablet; Take 50 mg by mouth  -     potassium chloride (K-DUR,KLOR-CON) 20 mEq tablet; Take 20 mEq by mouth         Subjective: This is a 66 rh male with a history of diabetes and psoriatic arthritis presents in a follow up visit for numbness and pain in hands and feet  He is currently on Lyrica 75 twice a day but the dose was decreased by his cardiologist due to lower extremity swelling he does take 75 in the morning and 50 at bedtime  Overall he denies any any dysesthesias but he does report a sense of numbness in his feet  He has  shortness of breath and he is followed by Cardiology  He was recently admitted to [facility] in September due to cardiac disease and shortness of breath medication have been adjusted      The symptoms began 4 to 5 years ago in the hands at the same time he was diagnosed with diabetes  It involves the feet to the ankle bilaterally and all of the fingers on the right ( except for the thumb) and the second digit on the left hand    three years ago he underwent right carpal tunnel release and cubital tunnel release which reduced the numbness in the palm  No wrist , elbow or ankle pain   he was placed on Lyrica 75 mg tid but he developed side effects ( weight gain, increase in blood sugars and incontinence) and he reduced the dose to 75 mg daily   gabapentin was tried but it caused side effects   no falls or problems with balance   He underwent emg of the [facility]  They confirmed the presence of a polyneuropathy   Recent labs with bun/cr 37/2 49                       The following portions of the patient's history were reviewed and updated as appropriate:   He  has a past medical history of CAD (coronary artery disease); Chronic kidney disease; History of prostate cancer; HTN (hypertension); Hyperlipidemia; Leukocytosis; Psoriatic arthritis ([facility] [address] ); SOB (shortness of breath); Syncope; Type 2 diabetes mellitus without complication, without long-term current use of insulin ([facility] [address] ); and Weakness generalized  He  has a past surgical history that includes Carpal tunnel release (Bilateral, 2015); Elbow surgery (Right, 2015); Hernia repair; Shoulder arthroscopy; and Skin cancer excision (11/2017)  His family history includes Cancer in his mother; Heart disease in his father  He  reports that he has never smoked  He has never used smokeless tobacco  He reports that he does not drink alcohol or use drugs    Current Outpatient Prescriptions   Medication Sig Dispense Refill    amLODIPine-benazepril (LOTREL 5-10) 5-10 MG per capsule Take 1 capsule by mouth daily      Ascorbic Acid (VITAMIN C) 1000 MG tablet Take 1,000 mg by mouth daily      ASPIRIN 81 PO Take 81 mg by mouth daily      cyanocobalamin (VITAMIN B-12) 1,000 mcg tablet Take 500 mcg by mouth      folic acid (FOLVITE) 1 mg tablet Take 1 mg by mouth daily      furosemide (LASIX) 40 mg tablet Take 40 mg by mouth as needed       hydrALAZINE (APRESOLINE) 50 mg tablet Take 50 mg by mouth      hydrochlorothiazide (HYDRODIURIL) 25 mg tablet Take 25 mg by mouth daily      insulin glargine (LANTUS SOLOSTAR) 100 units/mL injection pen Inject 92 Units under the skin      isosorbide mononitrate (IMDUR) 30 mg 24 hr tablet Take 30 mg by mouth      metoprolol succinate (TOPROL-XL) 50 mg 24 hr tablet Take 50 mg by mouth      potassium chloride (K-DUR,KLOR-CON) 20 mEq tablet Take 20 mEq by mouth      Probiotic Product (PROBIOTIC-10 ULTIMATE PO) Take by mouth      pyridoxine (VITAMIN B6) 100 mg tablet Take 100 mg by mouth daily      simvastatin (ZOCOR) 40 mg tablet Take 40 mg by mouth daily      traMADol (ULTRAM) 50 mg tablet Take 60 mg by mouth every 6 (six) hours as needed      metFORMIN (GLUCOPHAGE) 1000 MG tablet Take 1,000 mg by mouth 2 (two) times a day      nabumetone (RELAFEN) 750 mg tablet Take 750 mg by mouth 2 (two) times a day      pregabalin (LYRICA) 50 mg capsule Take 1 capsule (50 mg total) by mouth 2 (two) times a day 3  Month supply 180 capsule 1     No current facility-administered medications for this visit  He is allergic to clindamycin; doxycycline; penicillins; fish oil; methotrexate; and sulfamethoxazole-trimethoprim            Objective:    Blood pressure 132/60, pulse 88, resp  rate 16, height 5' 8" (1 727 m), weight 112 kg (247 lb)  Physical Exam   Constitutional: He appears well-developed  HENT:   Head: Normocephalic  Neurological:   Reflex Scores:       Bicep reflexes are 1+ on the right side and 1+ on the left side  Patellar reflexes are 1+ on the right side and 1+ on the left side  Achilles reflexes are Tr on the right side and Tr on the left side  Psychiatric: He has a normal mood and affect         Neurological Exam    Sensory  Sensory    10 sec in hands, vibration to knees , temp to  Calf ,       , absent vibration  in toes, ankles , jps intact in  feet,         Reflexes                                           Right                      Left  Biceps 1+                         1+  Patellar                                1+                         1+  Achilles                                Tr                         Tr  Plantar                           Downgoing                Downgoing    Right pathological reflexes: Essence's absent  Left pathological reflexes: Essence's absent  Coordination  Right: Finger-to-nose normal   Left: Finger-to-nose normal   Difficult heel to shin due to lower extremity swelling       Gait Unable to rise from chair without using arms  He has a wide-based gait due but was able to stand with some assistance from a chair           ROS:    Review of Systems   Constitutional: Negative  Negative for appetite change and fever  HENT: Positive for hearing loss  Negative for tinnitus, trouble swallowing and voice change  Sinus problem     Eyes: Negative  Negative for photophobia and pain  Respiratory: Positive for shortness of breath  Cardiovascular: Negative  Negative for palpitations  Gastrointestinal: Negative  Negative for nausea and vomiting  Endocrine: Negative  Negative for cold intolerance and heat intolerance  Erection difficulties     Genitourinary: Positive for frequency and urgency  Negative for dysuria  Loss of bladder control     Musculoskeletal: Negative  Negative for myalgias and neck pain  Skin: Negative  Negative for rash  Neurological: Positive for numbness  Negative for dizziness, tremors, seizures, syncope, facial asymmetry, speech difficulty, weakness, light-headedness and headaches  Memory problem  Clumsiness  Tingling   Difficulty walking   Hematological: Negative  Does not bruise/bleed easily  Psychiatric/Behavioral: Positive for sleep disturbance  Negative for confusion and hallucinations  13.5